# Patient Record
Sex: FEMALE | Race: BLACK OR AFRICAN AMERICAN | NOT HISPANIC OR LATINO | Employment: UNEMPLOYED | URBAN - METROPOLITAN AREA
[De-identification: names, ages, dates, MRNs, and addresses within clinical notes are randomized per-mention and may not be internally consistent; named-entity substitution may affect disease eponyms.]

---

## 2017-05-19 ENCOUNTER — HOSPITAL ENCOUNTER (EMERGENCY)
Facility: HOSPITAL | Age: 27
Discharge: HOME | End: 2017-05-19
Attending: EMERGENCY MEDICINE
Payer: COMMERCIAL

## 2017-05-19 VITALS
BODY MASS INDEX: 22.76 KG/M2 | RESPIRATION RATE: 16 BRPM | WEIGHT: 145 LBS | HEART RATE: 84 BPM | SYSTOLIC BLOOD PRESSURE: 142 MMHG | TEMPERATURE: 98 F | HEIGHT: 67 IN | OXYGEN SATURATION: 98 % | DIASTOLIC BLOOD PRESSURE: 86 MMHG

## 2017-05-19 DIAGNOSIS — R05.9 COUGH: ICD-10-CM

## 2017-05-19 DIAGNOSIS — S39.012A BACK STRAIN, INITIAL ENCOUNTER: Primary | ICD-10-CM

## 2017-05-19 PROCEDURE — 99283 EMERGENCY DEPT VISIT LOW MDM: CPT

## 2017-05-19 RX ORDER — CYCLOBENZAPRINE HCL 10 MG
10 TABLET ORAL 3 TIMES DAILY PRN
Qty: 15 TABLET | Refills: 0 | Status: SHIPPED | OUTPATIENT
Start: 2017-05-19 | End: 2017-05-24

## 2017-05-19 RX ORDER — NAPROXEN 375 MG/1
375 TABLET ORAL 2 TIMES DAILY WITH MEALS
Qty: 30 TABLET | Refills: 0 | OUTPATIENT
Start: 2017-05-19 | End: 2020-02-29

## 2017-05-19 NOTE — DISCHARGE INSTRUCTIONS
Back Sprain or Strain    Injury to the muscles (strain) or ligaments (sprain) around the spine can be troubling. Injury may occur after a sudden forceful twisting or bending force such as in a car accident, after a simple awkward movement, or after lifting something heavy with poor body positioning. In any case, muscle spasm is often present and adds to the pain.  Thankfully, most people feel better in 1 to 2 weeks, and most of the rest in 1 to 2 months. Most people can remain active. Unless you had a forceful or traumatic physical injury such as a car accident or fall, X-rays may not be ordered for the first evaluation of a back sprain or strain. If pain continues and does not respond to medical treatment, your healthcare provider may then order X-rays and other tests.  Home care  The following guidelines will help you care for your injury at home:  · When in bed, try to find a comfortable position. A firm mattress is best. Try lying flat on your back with pillows under your knees. You can also try lying on your side with your knees bent up toward your chest and a pillow between your knees.  · Don't sit for long periods. Try not to take long car rides or take other trips that have you sitting for a long time. This puts more stress on the lower back than standing or walking.  · During the first 24 to 72 hours after an injury or flare-up, apply an ice pack to the painful area for 20 minutes. Then remove it for 20 minutes. Do this for 60 to 90 minutes, or several times a day. This will reduce swelling and pain. Be sure to wrap the ice pack in a thin towel or plastic to protect your skin.  · You can start with ice, then switch to heat. Heat from a hot shower, hot bath, or heating pad reduces pain and works well for muscle spasms. Put heat on the painful area for 20 minutes, then remove for 20 minutes. Do this for 60 to 90 minutes, or several times a day. Do not use a heating pad while sleeping. It can burn the  skin.  · You can alternate the ice and heat. Talk with your healthcare provider to find out the best treatment or therapy for your back pain.  · Therapeutic massage will help relax the back muscles without stretching them.  · Be aware of safe lifting methods. Do not lift anything over 15 pounds until all of the pain is gone.  Medicines  Talk to your healthcare provider before using medicines, especially if you have other health problems or are taking other medicines.  · You may use acetaminophen or ibuprofen to control pain, unless another pain medicine was prescribed. If you have chronic conditions like diabetes, liver or kidney disease, stomach ulcers, or gastrointestinal bleeding, or are taking blood-thinner medicines, talk with your doctor before taking any medicines.  · Be careful if you are given prescription medicines, narcotics, or medicine for muscle spasm. They can cause drowsiness, and affect your coordination, reflexes, and judgment. Do not drive or operate heavy machinery when taking these types of medicines. Only take pain medicine as prescribed by your healthcare provider.  Follow-up care  Follow up with your healthcare provider, or as advised. You may need physical therapy or more tests if your symptoms get worse.  If you had X-rays your healthcare provider may be checking for any broken bones, breaks, or fractures. Bruises and sprains can sometimes hurt as much as a fracture. These injuries can take time to heal completely. If your symptoms dont improve or they get worse, talk with your healthcare provider. You may need a repeat X-ray or other tests.  Call 911  Call for emergency care if any of the following occur:  · Trouble breathing  · Confused  · Very drowsy or trouble awakening  · Fainting or loss of consciousness  · Rapid or very slow heart rate  · Loss of bowel or bladder control  When to seek medical advice  Call your healthcare provider right away if any of the following occur:  · Pain  gets worse or spreads to your arms or legs  · Weakness or numbness in one or both arms or legs  · Numbness in the groin or genital area  Date Last Reviewed: 6/1/2016  © 9452-6900 [employer]. [address] [place] [address], [place] [address]. All rights reserved. This information is not intended as a substitute for professional medical care. Always follow your healthcare professional's instructions.

## 2017-05-19 NOTE — ED AVS SNAPSHOT
[facility] -   [id number] [facility] [id number]               Yocasta Szymanski   2017 12:45 PM   ED    Description:  Female : 1990   Department:  [facility]           Your Care was Coordinated By:     Provider Role From To    Anoop Bergman MD Attending Provider 17 [phone] --      Reason for Visit     Back Pain           Diagnoses this Visit        Comments    Back strain, initial encounter    -  Primary     Cough           ED Disposition     ED Disposition Condition Comment    Discharge             To Do List           Follow-up Information     Follow up with PCP In 2 days.    Contact information:    [phone]       These Medications        Disp Refills Start End    naproxen (NAPROSYN) 375 MG tablet 30 tablet 0 2017     Take 1 tablet (375 mg total) by mouth 2 (two) times daily with meals. - Oral    cyclobenzaprine (FLEXERIL) 10 MG tablet 15 tablet 0 2017    Take 1 tablet (10 mg total) by mouth 3 (three) times daily as needed for Muscle spasms. - Oral      [facility]     [facility] Care [facility] -  Assistance  Unless otherwise directed by your provider, please contact [facility], our nurse care line that is available for  assistance.     Registered nurses in the [facility] provide: appointment scheduling, clinical advisement, health education, and other advisory services.  Call: [phone] (toll free)               Medications           Message regarding Medications     Verify the changes and/or additions to your medication regime listed below are the same as discussed with your clinician today.  If any of these changes or additions are incorrect, please notify your healthcare provider.        START taking these NEW medications        Refills    naproxen (NAPROSYN) 375 MG tablet 0    Sig: Take 1 tablet (375 mg total) by mouth 2 (two) times daily with meals.     "Class: Print    Route: Oral    cyclobenzaprine (FLEXERIL) 10 MG tablet 0    Sig: Take 1 tablet (10 mg total) by mouth 3 (three) times daily as needed for Muscle spasms.    Class: Print    Route: Oral           Verify that the below list of medications is an accurate representation of the medications you are currently taking.  If none reported, the list may be blank. If incorrect, please contact your healthcare provider. Carry this list with you in case of emergency.           Current Medications     cyclobenzaprine (FLEXERIL) 10 MG tablet Take 1 tablet (10 mg total) by mouth 3 (three) times daily as needed for Muscle spasms.    naproxen (NAPROSYN) 375 MG tablet Take 1 tablet (375 mg total) by mouth 2 (two) times daily with meals.           Clinical Reference Information           Your Vitals Were     BP Pulse Temp Resp Height Weight    142/86 (BP Location: Right arm, Patient Position: Sitting) 84 98.1 °F (36.7 °C) (Oral) 16 5' 7" (1.702 m) 65.8 kg (145 lb)    SpO2 BMI             98% 22.71 kg/m2         Allergies as of 5/19/2017     No Known Allergies      Immunizations Administered on Date of Encounter - 5/19/2017     None      ED Micro, Lab, POCT     None      ED Imaging Orders     Start Ordered       Status Ordering Provider    05/19/17 1256 05/19/17 1255  X-Ray Chest PA And Lateral  1 time imaging      Final result         Discharge Instructions         Back Sprain or Strain    Injury to the muscles (strain) or ligaments (sprain) around the spine can be troubling. Injury may occur after a sudden forceful twisting or bending force such as in a car accident, after a simple awkward movement, or after lifting something heavy with poor body positioning. In any case, muscle spasm is often present and adds to the pain.  Thankfully, most people feel better in 1 to 2 weeks, and most of the rest in 1 to 2 months. Most people can remain active. Unless you had a forceful or traumatic physical injury such as a car accident " or fall, X-rays may not be ordered for the first evaluation of a back sprain or strain. If pain continues and does not respond to medical treatment, your healthcare provider may then order X-rays and other tests.  Home care  The following guidelines will help you care for your injury at home:  · When in bed, try to find a comfortable position. A firm mattress is best. Try lying flat on your back with pillows under your knees. You can also try lying on your side with your knees bent up toward your chest and a pillow between your knees.  · Don't sit for long periods. Try not to take long car rides or take other trips that have you sitting for a long time. This puts more stress on the lower back than standing or walking.  · During the first 24 to 72 hours after an injury or flare-up, apply an ice pack to the painful area for 20 minutes. Then remove it for 20 minutes. Do this for 60 to 90 minutes, or several times a day. This will reduce swelling and pain. Be sure to wrap the ice pack in a thin towel or plastic to protect your skin.  · You can start with ice, then switch to heat. Heat from a hot shower, hot bath, or heating pad reduces pain and works well for muscle spasms. Put heat on the painful area for 20 minutes, then remove for 20 minutes. Do this for 60 to 90 minutes, or several times a day. Do not use a heating pad while sleeping. It can burn the skin.  · You can alternate the ice and heat. Talk with your healthcare provider to find out the best treatment or therapy for your back pain.  · Therapeutic massage will help relax the back muscles without stretching them.  · Be aware of safe lifting methods. Do not lift anything over 15 pounds until all of the pain is gone.  Medicines  Talk to your healthcare provider before using medicines, especially if you have other health problems or are taking other medicines.  · You may use acetaminophen or ibuprofen to control pain, unless another pain medicine was prescribed.  If you have chronic conditions like diabetes, liver or kidney disease, stomach ulcers, or gastrointestinal bleeding, or are taking blood-thinner medicines, talk with your doctor before taking any medicines.  · Be careful if you are given prescription medicines, narcotics, or medicine for muscle spasm. They can cause drowsiness, and affect your coordination, reflexes, and judgment. Do not drive or operate heavy machinery when taking these types of medicines. Only take pain medicine as prescribed by your healthcare provider.  Follow-up care  Follow up with your healthcare provider, or as advised. You may need physical therapy or more tests if your symptoms get worse.  If you had X-rays your healthcare provider may be checking for any broken bones, breaks, or fractures. Bruises and sprains can sometimes hurt as much as a fracture. These injuries can take time to heal completely. If your symptoms dont improve or they get worse, talk with your healthcare provider. You may need a repeat X-ray or other tests.  Call 911  Call for emergency care if any of the following occur:  · Trouble breathing  · Confused  · Very drowsy or trouble awakening  · Fainting or loss of consciousness  · Rapid or very slow heart rate  · Loss of bowel or bladder control  When to seek medical advice  Call your healthcare provider right away if any of the following occur:  · Pain gets worse or spreads to your arms or legs  · Weakness or numbness in one or both arms or legs  · Numbness in the groin or genital area  Date Last Reviewed: 6/1/2016  © 2408-2357 [employer]. [address] [place] [address]. All rights reserved. This information is not intended as a substitute for professional medical care. Always follow your healthcare professional's instructions.          [facility] Sign-Up     Activating your [facility] account is as easy as 1-2-3!     1) Visit [email or web address], select Sign Up Now, enter this activation code and your  date of birth, then select Next.  [email or web address]  Expires: 7/3/2017  1:23 PM      2) Create a username and password to use when you visit [facility] in the future and select a security question in case you lose your password and select Next.    3) Enter your e-mail address and click Sign Up!    Additional Information  If you have questions, please e-mail [employer] [email or web address] or call [phone] to talk to our [facility] staff. Remember, [facility] is NOT to be used for urgent needs. For medical emergencies, dial 911.          [facility] complies with applicable Federal civil rights laws and does not discriminate on the basis of race, color, national origin, age, disability, or sex.        Language Assistance Services     ATTENTION: Language assistance services are available, free of charge. Please call [phone].      ATENCIÓN: Si habla español, tiene a oden disposición servicios gratuitos de asistencia lingüística. Llame al [phone].     CHÚ Ý: N?u b?n nói Ti?ng Vi?t, có các d?ch v? h? tr? ngôn ng? mi?n phí dành cho b?n. G?i s? [phone].

## 2017-05-19 NOTE — ED PROVIDER NOTES
SCRIBE #1 NOTE: I, Yesica Welch, am scribing for, and in the presence of, Anoop Bergman MD. I have scribed the entire note.      History      Chief Complaint   Patient presents with    Back Pain     pt states her back has been hurting for 3 days and she has had a cough       Review of patient's allergies indicates:  No Known Allergies     HPI   HPI    5/19/2017, 12:51 PM   History obtained from the patient      History of Present Illness: Yocasta Szymanski is a 27 y.o. female patient who presents to the Emergency Department for back pain which onset gradually 3 days ago. Pt reports that she does a lot of heavy lifting at work. Symptoms are constant and moderate in severity.  No mitigating or exacerbating factors reported. Associated sxs include cough. Patient denies any fever, chills, extremity weakness/numbness, saddle anesthesia, bowel/bladder incontinence, and all other sxs at this time. No further complaints or concerns at this time.        Arrival mode: Personal vehicle     PCP: Unknown      Past Medical History:  History reviewed. No pertinent past medical history.    Past Surgical History:  History reviewed. No pertinent surgical history.      Family History:  History reviewed. No pertinent family history.    Social History:  Social History     Social History Main Topics    Smoking status: Never Smoker    Smokeless tobacco: No    Alcohol use No    Drug use: No    Sexual activity: Unknown       ROS   Review of Systems   Constitutional: Negative for chills and fever.   HENT: Negative for sore throat.    Respiratory: Positive for cough. Negative for shortness of breath.    Cardiovascular: Negative for chest pain.   Gastrointestinal: Negative for nausea.   Genitourinary: Negative for dysuria.        - bowel/bladder incontinence   Musculoskeletal: Positive for back pain.   Skin: Negative for rash.   Neurological: Negative for weakness and numbness.        - saddle anesthesia   Hematological: Does  "not bruise/bleed easily.       Physical Exam    Initial Vitals   BP Pulse Resp Temp SpO2   05/19/17 1245 05/19/17 1245 05/19/17 1245 05/19/17 1245 05/19/17 1245   142/86 84 16 98.1 °F (36.7 °C) 98 %      Physical Exam  Nursing Notes and Vital Signs Reviewed.  Constitutional: Patient is in no apparent distress. Well-developed and well-nourished.  Head: Atraumatic. Normocephalic.  Eyes: PERRL. EOM intact. Conjunctivae are not pale. No scleral icterus.  ENT: Mucous membranes are moist. Oropharynx is clear and symmetric.    Neck: Supple. Full ROM. No lymphadenopathy. No cervical midline bony tenderness, deformities, or step-offs.  Cardiovascular: Regular rate. Regular rhythm. No murmurs, rubs, or gallops. Distal pulses are 2+ and symmetric.  Pulmonary/Chest: No respiratory distress. Clear to auscultation bilaterally. No wheezing, rales, or rhonchi.  Abdominal: Soft and non-distended.  There is no tenderness.  No rebound, guarding, or rigidity. Good bowel sounds. Ostomy bag to LLQ.  Back: Bilat muscle spasms. No midline bony tenderness, deformities, or step-offs of the T-spine or L-spine. Skin appears normal without abrasions or bruising. No erythema, induration, or fluctuance.   Musculoskeletal: Moves all extremities. No obvious deformities. No edema. No calf tenderness.  Skin: Warm and dry.  Neurological:  Alert, awake, and appropriate.  Normal speech.  No acute focal neurological deficits are appreciated.  Psychiatric: Normal affect. Good eye contact. Appropriate in content.    ED Course    Procedures  ED Vital Signs:  Vitals:    05/19/17 1245   BP: (!) 142/86   Pulse: 84   Resp: 16   Temp: 98.1 °F (36.7 °C)   TempSrc: Oral   SpO2: 98%   Weight: 65.8 kg (145 lb)   Height: 5' 7" (1.702 m)         Imaging Results:    Imaging Results         X-Ray Chest PA And Lateral (Final result) Result time:  05/19/17 13:13:05    Final result by Kvng Maldonado MD (05/19/17 13:13:05)    Impression:         Negative chest " radiograph.            Electronically signed by: OTTONIEL QUINTANA MD  Date:     05/19/17  Time:    13:13     Narrative:    Exam: Two-view chest radiograph    Clinical History: cough.      Comparison: None.    Findings:   The lungs are clear. The cardiac silhouette is within normal limits. No pleural effusion or pneumothorax. The bones are intact.                       The Emergency Provider reviewed the vital signs and test results, which are outlined above.    ED Discussion     1:20 PM: Reassessed pt at this time.   Discussed with pt all pertinent ED information and results. Discussed pt dx and plan of tx. Gave pt all f/u and return to the ED instructions. All questions and concerns were addressed at this time. Pt expresses understanding of information and instructions, and is comfortable with plan to discharge. Pt is stable for discharge.      Follow-up Information     Follow up with PCP In 2 days.    Contact information:    [phone]            Medical Decision Making    Medical Decision Making:   Clinical Tests:   Radiological Study: Ordered and Reviewed           Scribe Attestation:   Scribe #1: I performed the above scribed service and the documentation accurately describes the services I performed. I attest to the accuracy of the note.    Attending:   Physician Attestation Statement for Scribe #1: I, Anoop Bergman MD, personally performed the services described in this documentation, as scribed by Yesica Welch, in my presence, and it is both accurate and complete.          Clinical Impression       ICD-10-CM ICD-9-CM   1. Back strain, initial encounter S39.012A 847.9   2. Cough R05 786.2       Disposition:   Disposition: Discharged  Condition: Stable         Anoop Bergman MD  05/19/17 1682

## 2017-08-15 ENCOUNTER — HOSPITAL ENCOUNTER (EMERGENCY)
Facility: HOSPITAL | Age: 27
Discharge: HOME | End: 2017-08-15
Attending: EMERGENCY MEDICINE
Payer: MEDICAID

## 2017-08-15 VITALS
RESPIRATION RATE: 19 BRPM | HEIGHT: 67 IN | BODY MASS INDEX: 21.97 KG/M2 | TEMPERATURE: 98 F | WEIGHT: 140 LBS | HEART RATE: 63 BPM | DIASTOLIC BLOOD PRESSURE: 89 MMHG | OXYGEN SATURATION: 98 % | SYSTOLIC BLOOD PRESSURE: 159 MMHG

## 2017-08-15 DIAGNOSIS — R11.2 NON-INTRACTABLE VOMITING WITH NAUSEA, UNSPECIFIED VOMITING TYPE: Primary | ICD-10-CM

## 2017-08-15 LAB
B-HCG UR QL: NEGATIVE
BACTERIA #/AREA URNS HPF: ABNORMAL /HPF
BILIRUB UR QL STRIP: NEGATIVE
CLARITY UR: CLEAR
COLOR UR: YELLOW
GLUCOSE UR QL STRIP: NEGATIVE
HGB UR QL STRIP: ABNORMAL
HYALINE CASTS #/AREA URNS LPF: 0 /LPF
KETONES UR QL STRIP: NEGATIVE
LEUKOCYTE ESTERASE UR QL STRIP: NEGATIVE
MICROSCOPIC COMMENT: ABNORMAL
NITRITE UR QL STRIP: NEGATIVE
PH UR STRIP: 6 [PH] (ref 5–8)
PROT UR QL STRIP: ABNORMAL
RBC #/AREA URNS HPF: 10 /HPF (ref 0–4)
SP GR UR STRIP: >=1.03 (ref 1–1.03)
SQUAMOUS #/AREA URNS HPF: 5 /HPF
URN SPEC COLLECT METH UR: ABNORMAL
UROBILINOGEN UR STRIP-ACNC: NEGATIVE EU/DL
WBC #/AREA URNS HPF: 3 /HPF (ref 0–5)
YEAST URNS QL MICRO: ABNORMAL

## 2017-08-15 PROCEDURE — 99283 EMERGENCY DEPT VISIT LOW MDM: CPT

## 2017-08-15 PROCEDURE — 81025 URINE PREGNANCY TEST: CPT

## 2017-08-15 PROCEDURE — 81000 URINALYSIS NONAUTO W/SCOPE: CPT

## 2017-08-15 RX ORDER — PROMETHAZINE HYDROCHLORIDE 25 MG/1
25 TABLET ORAL EVERY 6 HOURS PRN
Qty: 15 TABLET | Refills: 0 | Status: ON HOLD | OUTPATIENT
Start: 2017-08-15 | End: 2023-08-30 | Stop reason: HOSPADM

## 2017-08-15 NOTE — ED PROVIDER NOTES
SCRIBE #1 NOTE: I, Jaxson Perez, am scribing for, and in the presence of, Viktor Seo Jr., MD. I have scribed the entire note.     SCRIBE #2 NOTE: I, Gabriella Fischer, am scribing for, and in the presence of,  Binu Troncoso MD. I have scribed the remaining portions of the note not scribed by Scribe #1.     History      Chief Complaint   Patient presents with    Nausea     x 2 days. LMP aug 1st. reports it was irregular.       Review of patient's allergies indicates:  No Known Allergies     HPI   HPI    8/15/2017, 12:43 AM   History obtained from the patient      History of Present Illness: Yocasta Szymanski is a 27 y.o. female patient who presents to the Emergency Department for nausea which onset gradually 2 days ago. Symptoms are intermittent and moderate in severity. Pt's LMP was on August 1st but states it was irregular. No mitigating or exacerbating factors reported. Associated sxs include vomiting. Patient denies any fever, chills, diarrhea, blood in stool, constipation, abdominal pain, dysuria, hematuria, vaginal bleeding, and all other sxs at this time. No further complaints or concerns at this time.         Arrival mode: Personal vehicle    PCP: Primary Doctor No       Past Medical History:  Unknown    Past Surgical History:  Unknown    Family History:  Unknown    Social History:  Social History     Social History Main Topics    Smoking status: Never Smoker    Smokeless tobacco: Unknown    Alcohol use No    Drug use: No    Sexual activity: Unknown       ROS   Review of Systems   Constitutional: Negative for chills and fever.   HENT: Negative for sore throat.    Respiratory: Negative for shortness of breath.    Cardiovascular: Negative for chest pain.   Gastrointestinal: Positive for nausea and vomiting. Negative for abdominal pain, anal bleeding, blood in stool, constipation and diarrhea.   Genitourinary: Negative for dysuria, hematuria, vaginal bleeding, vaginal discharge and vaginal pain.  "  Musculoskeletal: Negative for back pain.   Skin: Negative for rash.   Neurological: Negative for weakness.   Hematological: Does not bruise/bleed easily.       Physical Exam      Initial Vitals [08/15/17 0021]   BP Pulse Resp Temp SpO2   (!) 159/89 63 19 97.9 °F (36.6 °C) 98 %      MAP       112.33          Physical Exam  Nursing Notes and Vital Signs Reviewed.  Constitutional: Patient is in no acute distress. Well-developed and well-nourished.  Head: Atraumatic. Normocephalic.  Eyes: PERRL. EOM intact. Conjunctivae are not pale. No scleral icterus.  ENT: Mucous membranes are moist. Oropharynx is clear and symmetric.    Neck: Supple. Full ROM. No lymphadenopathy.  Cardiovascular: Regular rate. Regular rhythm. No murmurs, rubs, or gallops. Distal pulses are 2+ and symmetric.  Pulmonary/Chest: No respiratory distress. Clear to auscultation bilaterally. No wheezing, rales, or rhonchi.  Abdominal: Soft and non-distended.  There is no tenderness.  No rebound, guarding, or rigidity.   Musculoskeletal: Moves all extremities. No obvious deformities. No edema.   Skin: Warm and dry.  Neurological:  Alert, awake, and appropriate.  Normal speech.  No acute focal neurological deficits are appreciated.  Psychiatric: Normal affect. Good eye contact. Appropriate in content.    ED Course    Procedures  ED Vital Signs:  Vitals:    08/15/17 0021   BP: (!) 159/89   Pulse: 63   Resp: 19   Temp: 97.9 °F (36.6 °C)   TempSrc: Oral   SpO2: 98%   Weight: 63.5 kg (140 lb)   Height: 5' 7" (1.702 m)       Abnormal Lab Results:  Labs Reviewed   URINALYSIS - Abnormal; Notable for the following:        Result Value    Specific Gravity, UA >=1.030 (*)     Protein, UA 2+ (*)     Occult Blood UA 2+ (*)     All other components within normal limits   URINALYSIS MICROSCOPIC - Abnormal; Notable for the following:     RBC, UA 10 (*)     Bacteria, UA Few (*)     Yeast, UA Rare (*)     All other components within normal limits   PREGNANCY TEST, URINE " RAPID        All Lab Results:  Results for orders placed or performed during the hospital encounter of 08/15/17   Urinalysis   Result Value Ref Range    Specimen UA Urine, Clean Catch     Color, UA Yellow Yellow, Straw, Sabine    Appearance, UA Clear Clear    pH, UA 6.0 5.0 - 8.0    Specific Gravity, UA >=1.030 (A) 1.005 - 1.030    Protein, UA 2+ (A) Negative    Glucose, UA Negative Negative    Ketones, UA Negative Negative    Bilirubin (UA) Negative Negative    Occult Blood UA 2+ (A) Negative    Nitrite, UA Negative Negative    Urobilinogen, UA Negative <2.0 EU/dL    Leukocytes, UA Negative Negative   Pregnancy, urine rapid   Result Value Ref Range    Preg Test, Ur Negative    Urinalysis Microscopic   Result Value Ref Range    RBC, UA 10 (H) 0 - 4 /hpf    WBC, UA 3 0 - 5 /hpf    Bacteria, UA Few (A) None-Occ /hpf    Yeast, UA Rare (A) None    Squam Epithel, UA 5 /hpf    Hyaline Casts, UA 0 0-1/lpf /lpf    Microscopic Comment SEE COMMENT                   The Emergency Provider reviewed the vital signs and test results, which are outlined above.    ED Discussion     1:07 AM: Dr. Seo transfers care of pt to Dr. Troncoso, pending lab results.      2:22 AM: Reassessed pt at this time.  Pt is awake, alert, and in no distress. Discussed with pt all pertinent ED information and results. Discussed pt dx and plan of tx. Gave pt all f/u and return to the ED instructions. All questions and concerns were addressed at this time. Pt expresses understanding of information and instructions, and is comfortable with plan to discharge. Pt is stable for discharge.    I discussed with patient and/or family/caretaker that evaluation in the ED does not suggest any emergent or life threatening medical conditions requiring immediate intervention beyond what was provided in the ED, and I believe patient is safe for discharge.  Regardless, an unremarkable evaluation in the ED does not preclude the development or presence of a serious of life  threatening condition. As such, patient was instructed to return immediately for any worsening or change in current symptoms.      ED Medication(s):  Medications - No data to display    Discharge Medication List as of 8/15/2017  2:26 AM      START taking these medications    Details   promethazine (PHENERGAN) 25 MG tablet Take 1 tablet (25 mg total) by mouth every 6 (six) hours as needed for Nausea., Starting Tue 8/15/2017, Print             Follow-up Information     [facility] In 2 days.    Specialty:  Internal Medicine  Contact information:  [address] [facility] [address] [phone]  Additional information:  (off [facility]) 1st floor           [facility] .    Specialty:  Emergency Medicine  Why:  If symptoms worsen  Contact information:  [address] [facility] [address] [phone]                   Medical Decision Making    Medical Decision Making:   Clinical Tests:   Lab Tests: Ordered and Reviewed           Scribe Attestation:   Scribe #1: I performed the above scribed service and the documentation accurately describes the services I performed. I attest to the accuracy of the note.    Attending:   Physician Attestation Statement for Scribe #1: I, Viktor Seo Jr., MD, personally performed the services described in this documentation, as scribed by Jaxson Perez, in my presence, and it is both accurate and complete.       Scribe Attestation:   Scribe #2: I performed the above scribed service and the documentation accurately describes the services I performed. I attest to the accuracy of the note.    Attending Attestation:           Physician Attestation for Scribe:    Physician Attestation Statement for Scribe #2: I, Binu Troncoso MD, reviewed documentation, as scribed by Gabriella Fischer in my presence, and it is both accurate and complete. I also acknowledge and confirm the content of the note done by Sharonda  #1.          Clinical Impression       ICD-10-CM ICD-9-CM   1. Non-intractable vomiting with nausea, unspecified vomiting type R11.2 787.01       Disposition:   Disposition: Discharged  Condition: Stable         Binu Troncoso MD  08/15/17 [id number]

## 2018-05-31 ENCOUNTER — HOSPITAL ENCOUNTER (EMERGENCY)
Facility: HOSPITAL | Age: 28
Discharge: HOME | End: 2018-05-31
Payer: MEDICAID

## 2018-05-31 VITALS
TEMPERATURE: 99 F | BODY MASS INDEX: 21.28 KG/M2 | WEIGHT: 135.56 LBS | HEIGHT: 67 IN | HEART RATE: 62 BPM | OXYGEN SATURATION: 100 % | DIASTOLIC BLOOD PRESSURE: 103 MMHG | SYSTOLIC BLOOD PRESSURE: 147 MMHG | RESPIRATION RATE: 16 BRPM

## 2018-05-31 DIAGNOSIS — N30.01 ACUTE CYSTITIS WITH HEMATURIA: Primary | ICD-10-CM

## 2018-05-31 DIAGNOSIS — J18.9 ATYPICAL PNEUMONIA: ICD-10-CM

## 2018-05-31 DIAGNOSIS — R10.31 RLQ ABDOMINAL PAIN: ICD-10-CM

## 2018-05-31 DIAGNOSIS — D36.9 DERMOID CYST: ICD-10-CM

## 2018-05-31 LAB
ALBUMIN SERPL BCP-MCNC: 2.8 G/DL
ALP SERPL-CCNC: 73 U/L
ALT SERPL W/O P-5'-P-CCNC: 16 U/L
ANION GAP SERPL CALC-SCNC: 7 MMOL/L
AST SERPL-CCNC: 30 U/L
B-HCG UR QL: NEGATIVE
BACTERIA #/AREA URNS HPF: ABNORMAL /HPF
BASOPHILS # BLD AUTO: 0 K/UL
BASOPHILS NFR BLD: 0 %
BILIRUB SERPL-MCNC: 0.5 MG/DL
BILIRUB UR QL STRIP: ABNORMAL
BUN SERPL-MCNC: 7 MG/DL
CALCIUM SERPL-MCNC: 8.7 MG/DL
CHLORIDE SERPL-SCNC: 113 MMOL/L
CLARITY UR: CLEAR
CO2 SERPL-SCNC: 23 MMOL/L
COLOR UR: YELLOW
CREAT SERPL-MCNC: 0.7 MG/DL
DIFFERENTIAL METHOD: ABNORMAL
EOSINOPHIL # BLD AUTO: 0 K/UL
EOSINOPHIL NFR BLD: 0.3 %
ERYTHROCYTE [DISTWIDTH] IN BLOOD BY AUTOMATED COUNT: 14.6 %
EST. GFR  (AFRICAN AMERICAN): >60 ML/MIN/1.73 M^2
EST. GFR  (NON AFRICAN AMERICAN): >60 ML/MIN/1.73 M^2
GLUCOSE SERPL-MCNC: 85 MG/DL
GLUCOSE UR QL STRIP: NEGATIVE
HCT VFR BLD AUTO: 31.4 %
HGB BLD-MCNC: 11 G/DL
HGB UR QL STRIP: ABNORMAL
HYALINE CASTS #/AREA URNS LPF: 0 /LPF
KETONES UR QL STRIP: NEGATIVE
LEUKOCYTE ESTERASE UR QL STRIP: NEGATIVE
LYMPHOCYTES # BLD AUTO: 1.6 K/UL
LYMPHOCYTES NFR BLD: 48.9 %
MCH RBC QN AUTO: 31 PG
MCHC RBC AUTO-ENTMCNC: 35 G/DL
MCV RBC AUTO: 89 FL
MICROSCOPIC COMMENT: ABNORMAL
MONOCYTES # BLD AUTO: 0.3 K/UL
MONOCYTES NFR BLD: 8 %
NEUTROPHILS # BLD AUTO: 1.4 K/UL
NEUTROPHILS NFR BLD: 42.8 %
NITRITE UR QL STRIP: NEGATIVE
PH UR STRIP: 7 [PH] (ref 5–8)
PLATELET # BLD AUTO: 162 K/UL
PMV BLD AUTO: 10 FL
POTASSIUM SERPL-SCNC: 3.3 MMOL/L
PROT SERPL-MCNC: 9.1 G/DL
PROT UR QL STRIP: ABNORMAL
RBC # BLD AUTO: 3.55 M/UL
RBC #/AREA URNS HPF: 6 /HPF (ref 0–4)
SODIUM SERPL-SCNC: 143 MMOL/L
SP GR UR STRIP: >=1.03 (ref 1–1.03)
SQUAMOUS #/AREA URNS HPF: 20 /HPF
URN SPEC COLLECT METH UR: ABNORMAL
UROBILINOGEN UR STRIP-ACNC: ABNORMAL EU/DL
WBC # BLD AUTO: 3.27 K/UL
WBC #/AREA URNS HPF: 4 /HPF (ref 0–5)

## 2018-05-31 PROCEDURE — 99284 EMERGENCY DEPT VISIT MOD MDM: CPT | Mod: 25

## 2018-05-31 PROCEDURE — 85025 COMPLETE CBC W/AUTO DIFF WBC: CPT

## 2018-05-31 PROCEDURE — 80053 COMPREHEN METABOLIC PANEL: CPT

## 2018-05-31 PROCEDURE — 81025 URINE PREGNANCY TEST: CPT

## 2018-05-31 PROCEDURE — 81000 URINALYSIS NONAUTO W/SCOPE: CPT

## 2018-05-31 RX ORDER — IBUPROFEN 600 MG/1
600 TABLET ORAL EVERY 6 HOURS PRN
Qty: 20 TABLET | Refills: 0 | Status: ON HOLD | OUTPATIENT
Start: 2018-05-31 | End: 2023-08-30 | Stop reason: HOSPADM

## 2018-05-31 RX ORDER — LEVOFLOXACIN 500 MG/1
500 TABLET, FILM COATED ORAL DAILY
Qty: 5 TABLET | Refills: 0 | Status: SHIPPED | OUTPATIENT
Start: 2018-05-31 | End: 2018-06-05

## 2018-05-31 RX ORDER — ACETAMINOPHEN AND CODEINE PHOSPHATE 300; 30 MG/1; MG/1
1 TABLET ORAL EVERY 6 HOURS PRN
Qty: 12 TABLET | Refills: 0 | Status: SHIPPED | OUTPATIENT
Start: 2018-05-31 | End: 2018-06-10

## 2018-06-01 NOTE — ED PROVIDER NOTES
SCRIBE #1 NOTE: I, Hemalatha Posada, am scribing for, and in the presence of, Viktor Bautista NP. I have scribed the entire note.      History      Chief Complaint   Patient presents with    Flank Pain     Right side.  Denies any urinary or bowel symptoms.       Review of patient's allergies indicates:  No Known Allergies     HPI   HPI    5/31/2018, 7:10 PM   History obtained from the patient      History of Present Illness: Yocasta Szymanski is a 28 y.o. female patient who presents to the Emergency Department for R side abdominal pain which onset today. Symptoms are intermittent and moderate in severity. Pt reports having to leave work today due to sxs. Pt reports LMP was in April. No mitigating or exacerbating factors reported. No associated sxs at this time. Patient denies any CP, SOB, fever, chills, n/v/d, dysuria, hematuria, and all other sxs at this time. No prior Tx. No further complaints or concerns at this time.         Arrival mode: Personal vehicle    PCP: Primary Doctor No       Past Medical History:  Past medical history reviewed not relevant      Past Surgical History:  Past surgical history reviewed not relevant      Family History:  Family history reviewed not relevant      Social History:  Social History    Social History Main Topics    Social History Main Topics    Smoking status: Never smoker    Smokeless tobacco: Unknown if ever used    Alcohol Use: No    Drug Use: No    Sexual Activity: Unknown         ROS   Review of Systems   Constitutional: Negative for chills and fever.   HENT: Negative for sore throat.    Respiratory: Negative for shortness of breath.    Cardiovascular: Negative for chest pain.   Gastrointestinal: Positive for abdominal pain. Negative for diarrhea, nausea and vomiting.   Genitourinary: Negative for dysuria and hematuria.   Musculoskeletal: Negative for back pain and neck pain.   Skin: Negative for rash.   Neurological: Negative for dizziness, weakness and  "headaches.   Hematological: Does not bruise/bleed easily.   All other systems reviewed and are negative.      Physical Exam      Initial Vitals [05/31/18 1837]   BP Pulse Resp Temp SpO2   134/85 99 16 99.3 °F (37.4 °C) 97 %      MAP       101.33          Physical Exam  Nursing Notes and Vital Signs Reviewed.  Constitutional: Patient is in no acute distress. Well-developed and well-nourished.  Head: Atraumatic. Normocephalic.  Eyes: PERRL. EOM intact. Conjunctivae are not pale. No scleral icterus.  ENT: Mucous membranes are moist. Oropharynx is clear and symmetric.    Neck: Supple. Full ROM. No lymphadenopathy.  Cardiovascular: Regular rate. Regular rhythm. No murmurs, rubs, or gallops. Distal pulses are 2+ and symmetric.  Pulmonary/Chest: No respiratory distress. Clear to auscultation bilaterally. No wheezing or rales.  Abdominal: Soft and non-distended.  There is RLQ tenderness.  No rebound, guarding, or rigidity. Good bowel sounds.  Genitourinary: No CVA tenderness  Musculoskeletal: Moves all extremities. No obvious deformities. No edema. No calf tenderness.  Skin: Warm and dry.  Neurological:  Alert, awake, and appropriate.  Normal speech.  No acute focal neurological deficits are appreciated.  Psychiatric: Normal affect. Good eye contact. Appropriate in content.    ED Course    Procedures  ED Vital Signs:  Vitals:    05/31/18 1837   BP: 134/85   Pulse: 99   Resp: 16   Temp: 99.3 °F (37.4 °C)   TempSrc: Oral   SpO2: 97%   Weight: 61.5 kg (135 lb 9.3 oz)   Height: 5' 7" (1.702 m)       Abnormal Lab Results:  Labs Reviewed   CBC W/ AUTO DIFFERENTIAL - Abnormal; Notable for the following:        Result Value    WBC 3.27 (*)     RBC 3.55 (*)     Hemoglobin 11.0 (*)     Hematocrit 31.4 (*)     RDW 14.6 (*)     Gran # (ANC) 1.4 (*)     Lymph% 48.9 (*)     All other components within normal limits   COMPREHENSIVE METABOLIC PANEL - Abnormal; Notable for the following:     Potassium 3.3 (*)     Chloride 113 (*)     Total " Protein 9.1 (*)     Albumin 2.8 (*)     Anion Gap 7 (*)     All other components within normal limits   URINALYSIS - Abnormal; Notable for the following:     Specific Gravity, UA >=1.030 (*)     Protein, UA 3+ (*)     Bilirubin (UA) 1+ (*)     Occult Blood UA 2+ (*)     Urobilinogen, UA 2.0-3.0 (*)     All other components within normal limits   URINALYSIS MICROSCOPIC - Abnormal; Notable for the following:     RBC, UA 6 (*)     All other components within normal limits   PREGNANCY TEST, URINE RAPID        All Lab Results:  Results for orders placed or performed during the hospital encounter of 05/31/18   CBC auto differential   Result Value Ref Range    WBC 3.27 (L) 3.90 - 12.70 K/uL    RBC 3.55 (L) 4.00 - 5.40 M/uL    Hemoglobin 11.0 (L) 12.0 - 16.0 g/dL    Hematocrit 31.4 (L) 37.0 - 48.5 %    MCV 89 82 - 98 fL    MCH 31.0 27.0 - 31.0 pg    MCHC 35.0 32.0 - 36.0 g/dL    RDW 14.6 (H) 11.5 - 14.5 %    Platelets 162 150 - 350 K/uL    MPV 10.0 9.2 - 12.9 fL    Gran # (ANC) 1.4 (L) 1.8 - 7.7 K/uL    Lymph # 1.6 1.0 - 4.8 K/uL    Mono # 0.3 0.3 - 1.0 K/uL    Eos # 0.0 0.0 - 0.5 K/uL    Baso # 0.00 0.00 - 0.20 K/uL    Gran% 42.8 38.0 - 73.0 %    Lymph% 48.9 (H) 18.0 - 48.0 %    Mono% 8.0 4.0 - 15.0 %    Eosinophil% 0.3 0.0 - 8.0 %    Basophil% 0.0 0.0 - 1.9 %    Differential Method Automated    Comprehensive metabolic panel   Result Value Ref Range    Sodium 143 136 - 145 mmol/L    Potassium 3.3 (L) 3.5 - 5.1 mmol/L    Chloride 113 (H) 95 - 110 mmol/L    CO2 23 23 - 29 mmol/L    Glucose 85 70 - 110 mg/dL    BUN, Bld 7 6 - 20 mg/dL    Creatinine 0.7 0.5 - 1.4 mg/dL    Calcium 8.7 8.7 - 10.5 mg/dL    Total Protein 9.1 (H) 6.0 - 8.4 g/dL    Albumin 2.8 (L) 3.5 - 5.2 g/dL    Total Bilirubin 0.5 0.1 - 1.0 mg/dL    Alkaline Phosphatase 73 55 - 135 U/L    AST 30 10 - 40 U/L    ALT 16 10 - 44 U/L    Anion Gap 7 (L) 8 - 16 mmol/L    eGFR if African American >60 >60 mL/min/1.73 m^2    eGFR if non African American >60 >60  mL/min/1.73 m^2   Urinalysis Clean Catch   Result Value Ref Range    Specimen UA Urine, Clean Catch     Color, UA Yellow Yellow, Straw, Sabine    Appearance, UA Clear Clear    pH, UA 7.0 5.0 - 8.0    Specific Gravity, UA >=1.030 (A) 1.005 - 1.030    Protein, UA 3+ (A) Negative    Glucose, UA Negative Negative    Ketones, UA Negative Negative    Bilirubin (UA) 1+ (A) Negative    Occult Blood UA 2+ (A) Negative    Nitrite, UA Negative Negative    Urobilinogen, UA 2.0-3.0 (A) <2.0 EU/dL    Leukocytes, UA Negative Negative   Rapid Pregnancy, Urine   Result Value Ref Range    Preg Test, Ur Negative    Urinalysis Microscopic   Result Value Ref Range    RBC, UA 6 (H) 0 - 4 /hpf    WBC, UA 4 0 - 5 /hpf    Bacteria, UA Rare None-Occ /hpf    Squam Epithel, UA 20 /hpf    Hyaline Casts, UA 0 0-1/lpf /lpf    Microscopic Comment SEE COMMENT          Imaging Results:  Imaging Results          CT Renal Stone Study ABD Pelvis WO (Final result)  Result time 05/31/18 21:06:50    Final result by Vish Burgos MD (Timothy) (05/31/18 21:06:50)                 Impression:      The appear to be reticulonodular infiltrates involving the lower lobes left greater right.  Findings are suspicious for atypical pneumonia.    There is appears to be a large round pelvic mass containing focal fat.  Large dermoid measuring 7.5 x 6.2 cm is suspected.    Small amount of free fluid in the pelvis.    No acute bowel abnormalities.      Electronically signed by: Vish Burgos MD  Date:    05/31/2018  Time:    21:06             Narrative:    EXAMINATION:  CT RENAL STONE STUDY ABD PELVIS WO    CLINICAL HISTORY:  Hematuria;    TECHNIQUE:  Low dose axial images, sagittal and coronal reformations were obtained from the lung bases to the pubic symphysis.  Contrast was not administered.    COMPARISON:  None    FINDINGS:  This reticulonodular infiltrate identified at the left base.  Also appears to be some mild focal reticulonodular infiltrate at the right base  and in the right middle lobe.  Findings are suspicious for atypical pneumonia.    The liver appears unremarkable.  The no focal abnormalities in the spleen.  Gallbladder appears unremarkable.  Pancreas is unremarkable.  The    Kidneys appear unremarkable.    No acute bowel abnormalities.  Normal appendix.    Large round pelvic mass on the left anteriorly measuring 7.5 x 6.2 cm.  It contains a 2 cm fat nodule.  This likely represents a large dermoid cyst.    Small amount free fluid the pelvis.    Skeleton is intact.                                        The Emergency Provider reviewed the vital signs and test results, which are outlined above.    ED Discussion     9:30 PM: Reassessed pt at this time. Discussed with pt all pertinent ED information and results. Discussed pt dx and plan of tx. Gave pt all f/u and return to the ED instructions. All questions and concerns were addressed at this time. Pt expresses understanding of information and instructions, and is comfortable with plan to discharge. Pt is stable for discharge.  '  I discussed with patient and/or family/caretaker that evaluation in the ED does not suggest any emergent or life threatening medical conditions requiring immediate intervention beyond what was provided in the ED, and I believe patient is safe for discharge.  Regardless, an unremarkable evaluation in the ED does not preclude the development or presence of a serious of life threatening condition. As such, patient was instructed to return immediately for any worsening or change in current symptoms.      ED Medication(s):  Medications - No data to display    New Prescriptions    ACETAMINOPHEN-CODEINE 300-30MG (TYLENOL #3) 300-30 MG TAB    Take 1 tablet by mouth every 6 (six) hours as needed.    IBUPROFEN (ADVIL,MOTRIN) 600 MG TABLET    Take 1 tablet (600 mg total) by mouth every 6 (six) hours as needed.    LEVOFLOXACIN (LEVAQUIN) 500 MG TABLET    Take 1 tablet (500 mg total) by mouth once daily.        Follow-up Information     [facility] In 3 days.    Contact information:  [address] [facility] [address] [phone]             [facility] .    Specialty:  Emergency Medicine  Why:  If symptoms worsen  Contact information:  [address] [facility] [address] [phone]                   Medical Decision Making    Medical Decision Making:   Clinical Tests:   Lab Tests: Ordered and Reviewed  Radiological Study: Ordered and Reviewed           Scribe Attestation:   Scribe #1: I performed the above scribed service and the documentation accurately describes the services I performed. I attest to the accuracy of the note.    Attending:   Physician Attestation Statement for Scribe #1: I, Viktor Bautista NP, personally performed the services described in this documentation, as scribed by Hemalatha Posada, in my presence, and it is both accurate and complete.          Clinical Impression       ICD-10-CM ICD-9-CM   1. Acute cystitis with hematuria N30.01 595.0   2. Atypical pneumonia J18.9 486   3. RLQ abdominal pain R10.31 789.03       Disposition:   Disposition: Discharged  Condition: Stable         Viktor Bautista Jr., FNP  06/01/18 3186

## 2018-06-29 NOTE — PROVIDER PROGRESS NOTES - EMERGENCY DEPT.
Encounter Date: 5/31/2018    ED Physician Progress Notes        Physician Note:   Reviewing CT reports that have showed up in the radiology pool.  This is CT from May 31, 2018.  There appears to be a large 7 cm pelvic mass. On review of the chart, is unclear if patient is aware of this finding.  Therefore will ask the nursing staff to contact the patient to discuss the pelvic mass that was identified recommend that she follow up with OB.    CT report follows    The appear to be reticulonodular infiltrates involving the lower lobes left greater right.  Findings are suspicious for atypical pneumonia.     There is appears to be a large round pelvic mass containing focal fat.  Large dermoid measuring 7.5 x 6.2 cm is suspected.     Small amount of free fluid in the pelvis.     No acute bowel abnormalities.        Electronically signed by:     Vish Burgos MD  Date:                                   05/31/2018  Time:                                   21:06            Narrative:      EXAMINATION:  CT RENAL STONE STUDY ABD PELVIS WO     CLINICAL HISTORY:  Hematuria;     TECHNIQUE:  Low dose axial images, sagittal and coronal reformations were obtained from the lung bases to the pubic symphysis.  Contrast was not administered.     COMPARISON:  None     FINDINGS:  This reticulonodular infiltrate identified at the left base.  Also appears to be some mild focal reticulonodular infiltrate at the right base and in the right middle lobe.  Findings are suspicious for atypical pneumonia.     The liver appears unremarkable.  The no focal abnormalities in the spleen.  Gallbladder appears unremarkable.  Pancreas is unremarkable.  The     Kidneys appear unremarkable.     No acute bowel abnormalities.  Normal appendix.     Large round pelvic mass on the left anteriorly measuring 7.5 x 6.2 cm.  It contains a 2 cm fat nodule.  This likely represents a large dermoid cyst.     Small amount free fluid the pelvis.     Skeleton is  intact.

## 2018-06-30 ENCOUNTER — TELEPHONE (OUTPATIENT)
Dept: EMERGENCY MEDICINE | Facility: HOSPITAL | Age: 28
End: 2018-06-30

## 2018-06-30 NOTE — TELEPHONE ENCOUNTER
----- Message from Sindi Dexter DO sent at 6/29/2018  4:58 PM CDT -----  Please contact the patient and discussed the pelvic mass that was found on a CT performed on May 31, 2018.  I am not sure if she is aware of this finding.  Recommend she follow up with OB.      Of note, I am not sure why this result reappeared in the radiology review the pool.  I know this is almost greater than a month out.    This is my note that I put in her chart. . .  Reviewing CT reports that have showed up in the radiology pool.  This is CT from May 31, 2018.  There appears to be a large 7 cm pelvic mass. On review of the chart, is unclear if patient is aware of this finding.  Therefore will ask the nursing staff to contact the patient to discuss the pelvic mass that was identified recommend that she follow up with OB.    CT report follows    The appear to be reticulonodular infiltrates involving the lower lobes left greater right.  Findings are suspicious for atypical pneumonia.     There is appears to be a large round pelvic mass containing focal fat.  Large dermoid measuring 7.5 x 6.2 cm is suspected.     Small amount of free fluid in the pelvis.     No acute bowel abnormalities.        Electronically signed by:     Vish Burgos MD  Date:                                   05/31/2018  Time:                                   21:06            Narrative:      EXAMINATION:  CT RENAL STONE STUDY ABD PELVIS WO     CLINICAL HISTORY:  Hematuria;     TECHNIQUE:  Low dose axial images, sagittal and coronal reformations were obtained from the lung bases to the pubic symphysis.  Contrast was not administered.     COMPARISON:  None     FINDINGS:  This reticulonodular infiltrate identified at the left base.  Also appears to be some mild focal reticulonodular infiltrate at the right base and in the right middle lobe.  Findings are suspicious for atypical pneumonia.     The liver appears unremarkable.  The no focal abnormalities in the  spleen.  Gallbladder appears unremarkable.  Pancreas is unremarkable.  The     Kidneys appear unremarkable.     No acute bowel abnormalities.  Normal appendix.     Large round pelvic mass on the left anteriorly measuring 7.5 x 6.2 cm.  It contains a 2 cm fat nodule.  This likely represents a large dermoid cyst.     Small amount free fluid the pelvis.     Skeleton is intact.

## 2018-07-09 ENCOUNTER — DOCUMENTATION ONLY (OUTPATIENT)
Dept: GYNECOLOGIC ONCOLOGY | Facility: CLINIC | Age: 28
End: 2018-07-09

## 2018-07-09 NOTE — PROGRESS NOTES
Patient presented to the [facility] Emergency room on June 5, 2018 with complaints of right-sided abdominal pain.  It had started that day.    A renal stone CT was performed. This showed reticular low nodular infiltrates involving the lower lobes of the lung left > than right.  Suspicious for atypical pneumonia.  There was also a round pelvic mass containing focal fat.  Thought to be a dermoid cyst measuring 7.5 x 6.2 cm.    At the time of discharge from the emergency room the patient was started on Levaquin 500 mg daily.  She was also given a prescription for Tylenol 3.

## 2018-09-18 ENCOUNTER — HOSPITAL ENCOUNTER (EMERGENCY)
Facility: HOSPITAL | Age: 28
Discharge: HOME | End: 2018-09-19
Attending: EMERGENCY MEDICINE
Payer: MEDICAID

## 2018-09-18 DIAGNOSIS — A53.0 POSITIVE RPR TEST: ICD-10-CM

## 2018-09-18 DIAGNOSIS — N73.0 PID (ACUTE PELVIC INFLAMMATORY DISEASE): ICD-10-CM

## 2018-09-18 DIAGNOSIS — Z86.19 HISTORY OF SYPHILIS: ICD-10-CM

## 2018-09-18 DIAGNOSIS — N89.8 VAGINAL LESION: Primary | ICD-10-CM

## 2018-09-18 DIAGNOSIS — B00.9 HSV-2 (HERPES SIMPLEX VIRUS 2) INFECTION: ICD-10-CM

## 2018-09-18 LAB
B-HCG UR QL: NEGATIVE
BACTERIA #/AREA URNS HPF: ABNORMAL /HPF
BILIRUB UR QL STRIP: NEGATIVE
CLARITY UR: CLEAR
COLOR UR: YELLOW
GLUCOSE UR QL STRIP: NEGATIVE
HGB UR QL STRIP: ABNORMAL
HIV1+2 IGG SERPL QL IA.RAPID: POSITIVE
HYALINE CASTS #/AREA URNS LPF: 0 /LPF
KETONES UR QL STRIP: NEGATIVE
LEUKOCYTE ESTERASE UR QL STRIP: NEGATIVE
MICROSCOPIC COMMENT: ABNORMAL
NITRITE UR QL STRIP: NEGATIVE
PH UR STRIP: 7 [PH] (ref 5–8)
PROT UR QL STRIP: ABNORMAL
RBC #/AREA URNS HPF: 10 /HPF (ref 0–4)
SP GR UR STRIP: >=1.03 (ref 1–1.03)
SQUAMOUS #/AREA URNS HPF: 5 /HPF
URN SPEC COLLECT METH UR: ABNORMAL
UROBILINOGEN UR STRIP-ACNC: 1 EU/DL
WBC #/AREA URNS HPF: 20 /HPF (ref 0–5)

## 2018-09-18 PROCEDURE — 86780 TREPONEMA PALLIDUM: CPT

## 2018-09-18 PROCEDURE — 87086 URINE CULTURE/COLONY COUNT: CPT

## 2018-09-18 PROCEDURE — 86703 HIV-1/HIV-2 1 RESULT ANTBDY: CPT

## 2018-09-18 PROCEDURE — 87210 SMEAR WET MOUNT SALINE/INK: CPT

## 2018-09-18 PROCEDURE — 81000 URINALYSIS NONAUTO W/SCOPE: CPT

## 2018-09-18 PROCEDURE — 87529 HSV DNA AMP PROBE: CPT

## 2018-09-18 PROCEDURE — 86593 SYPHILIS TEST NON-TREP QUANT: CPT

## 2018-09-18 PROCEDURE — 81025 URINE PREGNANCY TEST: CPT

## 2018-09-18 PROCEDURE — 86703 HIV-1/HIV-2 1 RESULT ANTBDY: CPT | Mod: 91

## 2018-09-18 PROCEDURE — 87389 HIV-1 AG W/HIV-1&-2 AB AG IA: CPT

## 2018-09-18 PROCEDURE — 99284 EMERGENCY DEPT VISIT MOD MDM: CPT | Mod: 25

## 2018-09-18 PROCEDURE — 86592 SYPHILIS TEST NON-TREP QUAL: CPT

## 2018-09-18 PROCEDURE — 96372 THER/PROPH/DIAG INJ SC/IM: CPT

## 2018-09-18 PROCEDURE — 87491 CHLMYD TRACH DNA AMP PROBE: CPT

## 2018-09-19 VITALS
WEIGHT: 136.81 LBS | OXYGEN SATURATION: 95 % | TEMPERATURE: 98 F | HEIGHT: 66 IN | SYSTOLIC BLOOD PRESSURE: 121 MMHG | RESPIRATION RATE: 16 BRPM | BODY MASS INDEX: 21.99 KG/M2 | HEART RATE: 74 BPM | DIASTOLIC BLOOD PRESSURE: 66 MMHG

## 2018-09-19 LAB
BACTERIA GENITAL QL WET PREP: ABNORMAL
CLUE CELLS VAG QL WET PREP: ABNORMAL
FILAMENT FUNGI VAG WET PREP-#/AREA: ABNORMAL
RPR SER QL: REACTIVE
RPR SER-TITR: ABNORMAL {TITER}
SPECIMEN SOURCE: ABNORMAL
T VAGINALIS GENITAL QL WET PREP: ABNORMAL
WBC #/AREA VAG WET PREP: ABNORMAL
YEAST GENITAL QL WET PREP: ABNORMAL

## 2018-09-19 PROCEDURE — 36415 COLL VENOUS BLD VENIPUNCTURE: CPT

## 2018-09-19 PROCEDURE — 25000003 PHARM REV CODE 250: Performed by: NURSE PRACTITIONER

## 2018-09-19 PROCEDURE — 63600175 PHARM REV CODE 636 W HCPCS: Performed by: NURSE PRACTITIONER

## 2018-09-19 RX ORDER — CEFTRIAXONE 250 MG/1
250 INJECTION, POWDER, FOR SOLUTION INTRAMUSCULAR; INTRAVENOUS
Status: COMPLETED | OUTPATIENT
Start: 2018-09-19 | End: 2018-09-19

## 2018-09-19 RX ORDER — DOXYCYCLINE HYCLATE 100 MG
100 TABLET ORAL
Status: COMPLETED | OUTPATIENT
Start: 2018-09-19 | End: 2018-09-19

## 2018-09-19 RX ORDER — BUTALBITAL, ACETAMINOPHEN AND CAFFEINE 50; 325; 40 MG/1; MG/1; MG/1
1 TABLET ORAL
Status: COMPLETED | OUTPATIENT
Start: 2018-09-19 | End: 2018-09-19

## 2018-09-19 RX ORDER — DOXYCYCLINE 100 MG/1
100 CAPSULE ORAL 2 TIMES DAILY
Qty: 56 CAPSULE | Refills: 0 | Status: SHIPPED | OUTPATIENT
Start: 2018-09-19 | End: 2018-10-17

## 2018-09-19 RX ORDER — VALACYCLOVIR HYDROCHLORIDE 1 G/1
1000 TABLET, FILM COATED ORAL 3 TIMES DAILY
Qty: 21 TABLET | Refills: 0 | Status: ON HOLD | OUTPATIENT
Start: 2018-09-19 | End: 2023-08-30 | Stop reason: HOSPADM

## 2018-09-19 RX ORDER — VALACYCLOVIR HYDROCHLORIDE 500 MG/1
1000 TABLET, FILM COATED ORAL
Status: COMPLETED | OUTPATIENT
Start: 2018-09-19 | End: 2018-09-19

## 2018-09-19 RX ADMIN — CEFTRIAXONE SODIUM 250 MG: 250 INJECTION, POWDER, FOR SOLUTION INTRAMUSCULAR; INTRAVENOUS at 02:09

## 2018-09-19 RX ADMIN — BUTALBITAL, ACETAMINOPHEN, AND CAFFEINE 1 TABLET: 50; 325; 40 TABLET ORAL at 12:09

## 2018-09-19 RX ADMIN — DOXYCYCLINE HYCLATE 100 MG: 100 TABLET, COATED ORAL at 03:09

## 2018-09-19 RX ADMIN — VALACYCLOVIR HYDROCHLORIDE 1000 MG: 500 TABLET, FILM COATED ORAL at 02:09

## 2018-09-19 NOTE — ED PROVIDER NOTES
SCRIBE #1 NOTE: I, Brando Aguero, am scribing for, and in the presence of, Todd Kemp NP. I have scribed the entire note.      History      Chief Complaint   Patient presents with    Female  Problem     and rash on hands       Review of patient's allergies indicates:  No Known Allergies     HPI   HPI    9/18/2018, 9:41 PM   History obtained from the patient      History of Present Illness: Yocasta Szymanski is a 28 y.o. female patient who presents to the Emergency Department for an evaluation of a rash to her hands which onset gradually a few days ago. Pt reports she thinks the rash is spreading. Pt denies having been evaluated for the rash by another r provider. Symptoms are constant and moderate in severity. Exacerbated by nothing and relieved by nothing. Associated sxs include a bump on pt's vagina . Patient denies any fever, chills, CP, SOB, abd pain, N/V, SOB, trouble wallowing, facial swelling, HA, weakness/numbness, neck pain/stiffness, and all other sxs at this time. No further complaints or concerns at this time.       Arrival mode: Personal vehicle    PCP: Primary Doctor No       Past Medical History:  Past medical history reviewed not relevant      Past Surgical History:  Past surgical history reviewed not relevant      Family History:  Family history reviewed not relevant      Social History:  Social History    Social History Main Topics    Social History Main Topics    Smoking status: Unknown if ever smoked    Smokeless tobacco: Unknown if ever used    Alcohol Use: Unknown drinking history    Drug Use: Unknown if ever used    Sexual Activity: Unknown       Social History:  Social History     Tobacco Use    Smoking status: Never Smoker   Substance and Sexual Activity    Alcohol use: No    Drug use: No    Sexual activity: Unknown       ROS   Review of Systems   Constitutional: Negative for chills and fever.   HENT: Negative for sore throat.    Respiratory: Negative for cough and  shortness of breath.    Cardiovascular: Negative for chest pain.   Gastrointestinal: Negative for abdominal pain, nausea and vomiting.   Genitourinary: Negative for dysuria, frequency, hematuria, urgency, vaginal bleeding and vaginal discharge.        (+) Bump on vagina   Musculoskeletal: Negative for back pain.   Skin: Positive for rash (Hands). Negative for pallor and wound.   Neurological: Negative for dizziness, weakness, light-headedness, numbness and headaches.   Hematological: Does not bruise/bleed easily.     Physical Exam      Initial Vitals [09/18/18 2050]   BP Pulse Resp Temp SpO2   (!) 153/91 86 19 98.2 °F (36.8 °C) 95 %      MAP       --          Physical Exam  Nursing Notes and Vital Signs Reviewed.  Constitutional: Patient is in no acute distress. Well-developed and well-nourished.  Head: Atraumatic. Normocephalic.  Eyes: PERRL. EOM intact. Conjunctivae are not pale. No scleral icterus.  ENT: Mucous membranes are moist. Oropharynx is clear and symmetric.    Neck: Supple. Full ROM. No lymphadenopathy.  Cardiovascular: Regular rate. Regular rhythm.  Pulmonary/Chest: No respiratory distress.  Abdominal: Soft and non-distended.  There is no tenderness.     Pelvic: A female chaperone was present for this examination. exteranal lesion to left labia minora. Cervical os is closed. There is mildCMT. There is no blood in the vaginal vault. Grey/white discharge. No adnexal tenderness. No adnexal masses.      Musculoskeletal: Moves all extremities. No obvious deformities. No edema. No calf tenderness. Rash to the dorsal and palmar sides of bilateral hands. Rash noted to fingers and webbing of hands.   Skin: Warm and dry.  Neurological:  Alert, awake, and appropriate.  Normal speech.  No acute focal neurological deficits are appreciated.  Psychiatric: Normal affect. Good eye contact. Appropriate in content.    ED Course    Procedures  ED Vital Signs:  Vitals:    09/18/18 2050 09/19/18 0310   BP: (!) 153/91 121/66  "  Pulse: 86 74   Resp: 19 16   Temp: 98.2 °F (36.8 °C)    TempSrc: Oral    SpO2: 95% 95%   Weight: 62.1 kg (136 lb 12.7 oz)    Height: 5' 6" (1.676 m)        Abnormal Lab Results:  Labs Reviewed   RPR - Abnormal; Notable for the following components:       Result Value    RPR Reactive (*)     All other components within normal limits   RAPID HIV - Abnormal; Notable for the following components:    HIV Rapid Testing Positive (*)     All other components within normal limits   URINALYSIS, REFLEX TO URINE CULTURE - Abnormal; Notable for the following components:    Specific Gravity, UA >=1.030 (*)     Protein, UA 3+ (*)     Occult Blood UA 2+ (*)     All other components within normal limits    Narrative:     Preferred Collection Type->Urine, Clean Catch   VAGINAL SCREEN - Abnormal; Notable for the following components:    Clue Cells, Wet Prep Many (*)     WBC - Vaginal Screen Few (*)     Bacteria - Vaginal Screen Many (*)     All other components within normal limits   URINALYSIS MICROSCOPIC - Abnormal; Notable for the following components:    RBC, UA 10 (*)     WBC, UA 20 (*)     Bacteria, UA Moderate (*)     All other components within normal limits    Narrative:     Preferred Collection Type->Urine, Clean Catch   RPR, QUANTITATIVE - Abnormal; Notable for the following components:    RPR Quantitative 1:16 (*)     All other components within normal limits   C. TRACHOMATIS/N. GONORRHOEAE BY AMP DNA   CULTURE, URINE   PREGNANCY TEST, URINE RAPID   HERPES SIMPLEX (HSV) BY RAPID PCR, NON-BLOOD    Narrative:     Receiving Lab?->[facility]   HIV 1 / 2 ANTIBODY   FTA ANTIBODIES, IGG AND IGM        All Lab Results:  Results for orders placed or performed during the hospital encounter of 09/18/18   RPR   Result Value Ref Range    RPR Reactive (A) Non-reactive   Rapid HIV   Result Value Ref Range    HIV Rapid Testing Positive (A) Negative   Urinalysis, Reflex to Urine Culture Urine, Clean Catch   Result Value Ref Range    " Specimen UA Urine, Clean Catch     Color, UA Yellow Yellow, Straw, Sabine    Appearance, UA Clear Clear    pH, UA 7.0 5.0 - 8.0    Specific Gravity, UA >=1.030 (A) 1.005 - 1.030    Protein, UA 3+ (A) Negative    Glucose, UA Negative Negative    Ketones, UA Negative Negative    Bilirubin (UA) Negative Negative    Occult Blood UA 2+ (A) Negative    Nitrite, UA Negative Negative    Urobilinogen, UA 1.0 <2.0 EU/dL    Leukocytes, UA Negative Negative   Rapid Pregnancy, Urine   Result Value Ref Range    Preg Test, Ur Negative    HSV by Rapid PCR, Non-Blood [facility]; Vagina   Result Value Ref Range    HSV PCR Source Vagina    Vaginal Screen Vagina   Result Value Ref Range    Trichomonas None None    Clue Cells, Wet Prep Many (A) None    Budding Yeast None None    Fungal Hyphae None None    WBC - Vaginal Screen Few (A) None    Bacteria - Vaginal Screen Many (A) None    Wet Prep Source Vagina None   Urinalysis Microscopic   Result Value Ref Range    RBC, UA 10 (H) 0 - 4 /hpf    WBC, UA 20 (H) 0 - 5 /hpf    Bacteria, UA Moderate (A) None-Occ /hpf    Squam Epithel, UA 5 /hpf    Hyaline Casts, UA 0 0-1/lpf /lpf    Microscopic Comment SEE COMMENT    RPR, Quantitative   Result Value Ref Range    RPR Quantitative 1:16 (A)        Imaging Results:  Imaging Results    None                 The Emergency Provider reviewed the vital signs and test results, which are outlined above.    ED Discussion      Reassessed pt at this time. Pt is awake, alert, and in no distress. Discussed with pt all pertinent ED information and results. Discussed pt dx and plan of tx. Gave pt all f/u and return to the ED instructions. All questions and concerns were addressed at this time. Pt expresses understanding of information and instructions, and is comfortable with plan to discharge. Pt is stable for discharge.    I discussed with patient and/or family/caretaker that evaluation in the ED does not suggest any emergent or life threatening medical conditions  requiring immediate intervention beyond what was provided in the ED, and I believe patient is safe for discharge.  Regardless, an unremarkable evaluation in the ED does not preclude the development or presence of a serious of life threatening condition. As such, patient was instructed to return immediately for any worsening or change in current symptoms.    PATIENT TO SEE Infectious disease doctor later today.  ED Medication(s):  Medications   butalbital-acetaminophen-caffeine -40 mg per tablet 1 tablet (1 tablet Oral Given 9/19/18 0037)   doxycycline tablet 100 mg (100 mg Oral Given 9/19/18 0310)   valACYclovir tablet 1,000 mg (1,000 mg Oral Given 9/19/18 0259)   cefTRIAXone injection 250 mg (250 mg Intramuscular Given 9/19/18 0259)       Follow-up Information     [facility] - .    Specialty:  Emergency Medicine  Why:  As needed, If symptoms worsen  Contact information:  [address] [facility] [address] [phone]           Schedule an appointment as soon as possible for a visit  with Infectious Disease.                   Medical Decision Making    Medical Decision Making:   Clinical Tests:   Lab Tests: Ordered and Reviewed           Scribe Attestation:   Scribe #1: I performed the above scribed service and the documentation accurately describes the services I performed. I attest to the accuracy of the note.    Attending:   Physician Attestation Statement for Scribe #1: I, Todd Kemp NP, personally performed the services described in this documentation, as scribed by Brando Aguero, in my presence, and it is both accurate and complete.          Clinical Impression       ICD-10-CM ICD-9-CM   1. Vaginal lesion N89.8 623.8   2. Positive RPR test A53.0 097.1   3. History of syphilis Z86.19 V12.09   4. HSV-2 (herpes simplex virus 2) infection B00.9 054.9   5. PID (acute pelvic inflammatory disease) N73.0 614.3       Disposition:   Disposition: Discharged  Condition:  Carolina Kemp, KEYLA  09/19/18 7381

## 2018-09-20 LAB
BACTERIA UR CULT: NORMAL
BACTERIA UR CULT: NORMAL
C TRACH DNA SPEC QL NAA+PROBE: NOT DETECTED
N GONORRHOEA DNA SPEC QL NAA+PROBE: NOT DETECTED

## 2018-09-21 LAB
HSV1 DNA SPEC QL NAA+PROBE: NEGATIVE
HSV2 DNA SPEC QL NAA+PROBE: POSITIVE
SPECIMEN SOURCE: ABNORMAL

## 2018-09-24 LAB — T PALLIDUM AB SER QL IF: REACTIVE

## 2018-09-25 LAB
HIV 1+2 AB+HIV1 P24 AG SERPL QL IA: POSITIVE
HIV SUPPLEMENTAL ASSAY INTERPRETATION: ABNORMAL
HIV-1 RESULT: POSITIVE
HIV-2 RESULT: NEGATIVE

## 2020-02-29 ENCOUNTER — HOSPITAL ENCOUNTER (EMERGENCY)
Facility: HOSPITAL | Age: 30
Discharge: HOME | End: 2020-02-29
Attending: EMERGENCY MEDICINE
Payer: MEDICAID

## 2020-02-29 VITALS
WEIGHT: 148.38 LBS | BODY MASS INDEX: 23.84 KG/M2 | DIASTOLIC BLOOD PRESSURE: 63 MMHG | HEART RATE: 75 BPM | SYSTOLIC BLOOD PRESSURE: 101 MMHG | RESPIRATION RATE: 20 BRPM | HEIGHT: 66 IN | OXYGEN SATURATION: 100 % | TEMPERATURE: 98 F

## 2020-02-29 DIAGNOSIS — N39.0 URINARY TRACT INFECTION WITHOUT HEMATURIA, SITE UNSPECIFIED: ICD-10-CM

## 2020-02-29 DIAGNOSIS — S20.211A RIB CONTUSION, RIGHT, INITIAL ENCOUNTER: Primary | ICD-10-CM

## 2020-02-29 DIAGNOSIS — R07.81 RIB PAIN ON RIGHT SIDE: ICD-10-CM

## 2020-02-29 LAB
B-HCG UR QL: NEGATIVE
BACTERIA #/AREA URNS HPF: NORMAL /HPF
BILIRUB UR QL STRIP: NEGATIVE
CLARITY UR: CLEAR
COLOR UR: YELLOW
GLUCOSE UR QL STRIP: NEGATIVE
HGB UR QL STRIP: ABNORMAL
KETONES UR QL STRIP: NEGATIVE
LEUKOCYTE ESTERASE UR QL STRIP: NEGATIVE
MICROSCOPIC COMMENT: NORMAL
NITRITE UR QL STRIP: POSITIVE
PH UR STRIP: 7 [PH] (ref 5–8)
PROT UR QL STRIP: ABNORMAL
RBC #/AREA URNS HPF: 1 /HPF (ref 0–4)
SP GR UR STRIP: 1.02 (ref 1–1.03)
SQUAMOUS #/AREA URNS HPF: 3 /HPF
URN SPEC COLLECT METH UR: ABNORMAL
UROBILINOGEN UR STRIP-ACNC: NEGATIVE EU/DL
WBC #/AREA URNS HPF: 1 /HPF (ref 0–5)

## 2020-02-29 PROCEDURE — 81025 URINE PREGNANCY TEST: CPT

## 2020-02-29 PROCEDURE — 81000 URINALYSIS NONAUTO W/SCOPE: CPT

## 2020-02-29 PROCEDURE — 96372 THER/PROPH/DIAG INJ SC/IM: CPT

## 2020-02-29 PROCEDURE — 99284 EMERGENCY DEPT VISIT MOD MDM: CPT | Mod: 25

## 2020-02-29 PROCEDURE — 63600175 PHARM REV CODE 636 W HCPCS: Performed by: EMERGENCY MEDICINE

## 2020-02-29 RX ORDER — KETOROLAC TROMETHAMINE 30 MG/ML
30 INJECTION, SOLUTION INTRAMUSCULAR; INTRAVENOUS
Status: COMPLETED | OUTPATIENT
Start: 2020-02-29 | End: 2020-02-29

## 2020-02-29 RX ORDER — NAPROXEN 500 MG/1
500 TABLET ORAL 2 TIMES DAILY WITH MEALS
Qty: 20 TABLET | Refills: 0 | Status: SHIPPED | OUTPATIENT
Start: 2020-02-29 | End: 2020-03-10

## 2020-02-29 RX ORDER — SULFAMETHOXAZOLE AND TRIMETHOPRIM 800; 160 MG/1; MG/1
1 TABLET ORAL 2 TIMES DAILY
Qty: 6 TABLET | Refills: 0 | Status: SHIPPED | OUTPATIENT
Start: 2020-02-29 | End: 2020-03-03

## 2020-02-29 RX ADMIN — KETOROLAC TROMETHAMINE 30 MG: 30 INJECTION, SOLUTION INTRAMUSCULAR at 01:02

## 2020-02-29 NOTE — DISCHARGE INSTRUCTIONS
For RIB CONTUSION, I recommended that the patient perform deep breathing exercises to help prevent pneumonia (take 10 deep breaths every hour while awake); brace ribs with hands or a pillow while coughing or deep breathing to help decrease the pain; allow for adequate amount of rest to to decrease swelling and allow the injury to heal faster; avoid activities that may cause more pain or damage to ribs; apply ice packs to help decrease swelling and pain and prevent tissue damage (use an ice pack or put crushed ice in a plastic bag and cover it with a towel and place it on injured area for 15 to 20 minutes every hour as directed).  I instructed patient to contact primary healthcare provider if they: develop a fever; notice increased bruising on chest; have chills and increased coughing; or have any questions or concerns about condition.  Advised patient to return to emergency department or call 911 if they suddenly have more severe chest pain; cough up blood; experience fever or chills and increased shortness of breath; have abdominal pain; suddenly feel lightheaded and short of breath, or notice tenderness and warmth to the arms or legs. Patient also instructed to take medications as prescribed and to avoid operating heavy machinery or driving while taking muscle relaxer's or pain medications.    Regarding CONTUSIONS, I advised patient to: REST the injured area or use it less than usual; apply ICE to decrease swelling and pain and help prevent tissue damage; use COMPRESSION with an elastic bandage to support the area and decrease swelling; ELEVATE injured body part above the level of the heart to help decrease pain and swelling; AVOID using massage or massage to acute injuries as it may slow healing of the area; AVOID drinking alcohol as it may slow healing of the injury; and avoid stretching injured muscles. Advised patient to return to the emergency department or contact primary care provider if: having trouble  moving injured area; notice tingling or numbness in or near the injured area; extremity below the bruise gets cold or turns pale; a new lump develops in the injured area; symptoms do not improve with treatment after 4 to 5 days; there is any questions or concerns about the condition or treatment plan.     For URINARY TRACT INFECTION, I instructed patient to avoid holding in urine and recommended that she urinate when she feels the urge.  Also advised patient to drink plenty of liquids and reminded patient that she may need to drink more fluids than usual to help flush out the bacteria. Instructed patient to avoid alcohol, caffeine, and citrus juices as these substances can irritate your bladder and increase your symptoms.  Also recommended that patient apply heat to lower abdomen for 20 to 30 minutes every two hours to help decrease discomfort and pressure in the bladder.
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
## 2021-10-20 ENCOUNTER — LAB VISIT (OUTPATIENT)
Dept: PRIMARY CARE CLINIC | Facility: OTHER | Age: 31
End: 2021-10-20
Attending: INTERNAL MEDICINE
Payer: MEDICAID

## 2021-10-20 DIAGNOSIS — Z20.822 ENCOUNTER FOR LABORATORY TESTING FOR COVID-19 VIRUS: ICD-10-CM

## 2021-10-20 PROCEDURE — U0003 INFECTIOUS AGENT DETECTION BY NUCLEIC ACID (DNA OR RNA); SEVERE ACUTE RESPIRATORY SYNDROME CORONAVIRUS 2 (SARS-COV-2) (CORONAVIRUS DISEASE [COVID-19]), AMPLIFIED PROBE TECHNIQUE, MAKING USE OF HIGH THROUGHPUT TECHNOLOGIES AS DESCRIBED BY CMS-2020-01-R: HCPCS | Performed by: INTERNAL MEDICINE

## 2021-10-21 LAB
SARS-COV-2 RNA RESP QL NAA+PROBE: NOT DETECTED
SARS-COV-2- CYCLE NUMBER: NORMAL

## 2023-08-26 PROBLEM — R53.83 FATIGUE: Status: ACTIVE | Noted: 2023-08-26

## 2023-08-26 PROBLEM — B20 HIV DISEASE: Status: ACTIVE | Noted: 2023-08-26

## 2023-08-26 PROBLEM — Z13.9 ENCOUNTER FOR MEDICAL SCREENING EXAMINATION: Status: ACTIVE | Noted: 2023-08-26

## 2023-08-26 PROBLEM — F43.23 ADJUSTMENT DISORDER WITH MIXED ANXIETY AND DEPRESSED MOOD: Status: ACTIVE | Noted: 2023-08-26

## 2023-08-28 PROBLEM — F43.20 GRIEF REACTION: Status: ACTIVE | Noted: 2023-08-28

## 2023-08-28 PROBLEM — F43.21 GRIEF REACTION: Status: ACTIVE | Noted: 2023-08-28

## 2023-11-27 PROBLEM — Z13.9 ENCOUNTER FOR MEDICAL SCREENING EXAMINATION: Status: RESOLVED | Noted: 2023-08-26 | Resolved: 2023-11-27

## 2023-12-31 ENCOUNTER — HOSPITAL ENCOUNTER (EMERGENCY)
Facility: HOSPITAL | Age: 33
Discharge: HOME | End: 2023-12-31
Attending: EMERGENCY MEDICINE
Payer: MEDICAID

## 2023-12-31 VITALS
WEIGHT: 180.13 LBS | SYSTOLIC BLOOD PRESSURE: 138 MMHG | RESPIRATION RATE: 16 BRPM | HEART RATE: 92 BPM | OXYGEN SATURATION: 100 % | BODY MASS INDEX: 28.95 KG/M2 | TEMPERATURE: 99 F | HEIGHT: 66 IN | DIASTOLIC BLOOD PRESSURE: 92 MMHG

## 2023-12-31 DIAGNOSIS — N76.4 LABIAL ABSCESS: Primary | ICD-10-CM

## 2023-12-31 PROCEDURE — 25000003 PHARM REV CODE 250: Performed by: NURSE PRACTITIONER

## 2023-12-31 PROCEDURE — 56405 I&D VULVA/PERINEAL ABSCESS: CPT

## 2023-12-31 PROCEDURE — 99284 EMERGENCY DEPT VISIT MOD MDM: CPT | Mod: 25

## 2023-12-31 RX ORDER — LIDOCAINE HYDROCHLORIDE 10 MG/ML
10 INJECTION, SOLUTION EPIDURAL; INFILTRATION; INTRACAUDAL; PERINEURAL
Status: COMPLETED | OUTPATIENT
Start: 2023-12-31 | End: 2023-12-31

## 2023-12-31 RX ORDER — HYDROCODONE BITARTRATE AND ACETAMINOPHEN 5; 325 MG/1; MG/1
1 TABLET ORAL EVERY 4 HOURS PRN
Qty: 18 TABLET | Refills: 0 | Status: SHIPPED | OUTPATIENT
Start: 2023-12-31

## 2023-12-31 RX ORDER — SULFAMETHOXAZOLE AND TRIMETHOPRIM 800; 160 MG/1; MG/1
1 TABLET ORAL 2 TIMES DAILY
Qty: 14 TABLET | Refills: 0 | Status: SHIPPED | OUTPATIENT
Start: 2023-12-31 | End: 2024-01-07

## 2023-12-31 RX ADMIN — LIDOCAINE HYDROCHLORIDE 100 MG: 10 SOLUTION INTRAVENOUS at 09:12

## 2024-01-01 NOTE — ED PROVIDER NOTES
Encounter Date: 12/31/2023       History     Chief Complaint   Patient presents with    Abscess     Pt has abscess to groin area.Pt HIV +.      Patient is a 33-year-old female who presents with complaints of an abscess to the vaginal area.  Reports onset was 2 days ago.  Denies any drainage to the area.  Denies any fever, nausea or vomiting.  No distress noted at this time.      Review of patient's allergies indicates:  No Known Allergies  Past Medical History:   Diagnosis Date    HIV (human immunodeficiency virus infection)      No past surgical history on file.  No family history on file.  Social History     Tobacco Use    Smoking status: Never   Substance Use Topics    Alcohol use: No    Drug use: No     Review of Systems   Constitutional:  Negative for fever.   HENT:  Negative for sore throat.    Respiratory:  Negative for shortness of breath.    Cardiovascular:  Negative for chest pain.   Gastrointestinal:  Negative for nausea.   Genitourinary:  Negative for dysuria.   Musculoskeletal:  Negative for back pain.   Skin:  Positive for wound (Abscess of the vagina). Negative for rash.   Neurological:  Negative for weakness.   Hematological:  Does not bruise/bleed easily.       Physical Exam     Initial Vitals [12/31/23 2058]   BP Pulse Resp Temp SpO2   (!) 138/92 92 16 99.2 °F (37.3 °C) 100 %      MAP       --         Physical Exam    Nursing note and vitals reviewed.  Constitutional: She appears well-developed and well-nourished.   HENT:   Head: Normocephalic and atraumatic.   Eyes: EOM are normal. Pupils are equal, round, and reactive to light.   Neck: Neck supple.   Normal range of motion.  Cardiovascular:  Normal rate, regular rhythm, normal heart sounds and intact distal pulses.           Pulmonary/Chest: Breath sounds normal.   Abdominal: Abdomen is soft. Bowel sounds are normal.   Musculoskeletal:         General: Normal range of motion.      Cervical back: Normal range of motion and neck supple.      Neurological: She is alert and oriented to person, place, and time. She has normal strength and normal reflexes.   Skin: Skin is warm and dry. Abscess (noted to the left labia, moderate fluctuance and induration noted) noted.         ED Course   I & D - Incision and Drainage    Date/Time: 12/31/2023 9:41 PM  Location procedure was performed: [facility] EMERGENCY DEPARTMENT    Performed by: Bahman Griffin NP  Authorized by: Daryl Kyle MD  Type: abscess  Location: Vagina.    Anesthesia:  Local Anesthetic: lidocaine 1% without epinephrine  Scalpel size: 11  Incision type: single straight  Complexity: simple  Drainage: purulent  Drainage amount: copious  Wound treatment: incision, wound left open, deloculation and wound packed  Packing material: 1/4 in gauze  Patient tolerance: Patient tolerated the procedure well with no immediate complications        Labs Reviewed - No data to display       Imaging Results    None          Medications   LIDOcaine (PF) 10 mg/ml (1%) injection 100 mg (100 mg Infiltration Given 12/31/23 2115)     Medical Decision Making  Patient instructed to remove packing in 48 hours and to take all antibiotics as prescribed.  Patient to return to the emergency room with any worsening symptoms.  No distress noted at time of discharge.    Risk  Prescription drug management.                                      Clinical Impression:  Final diagnoses:  [N76.4] Labial abscess (Primary)          ED Disposition Condition    Discharge Stable          ED Prescriptions       Medication Sig Dispense Start Date End Date Auth. Provider    sulfamethoxazole-trimethoprim 800-160mg (BACTRIM DS) 800-160 mg Tab Take 1 tablet by mouth 2 (two) times daily. for 7 days 14 tablet 12/31/2023 1/7/2024 Bahman Griffin NP    HYDROcodone-acetaminophen (NORCO) 5-325 mg per tablet Take 1 tablet by mouth every 4 (four) hours as needed. 18 tablet 12/31/2023 -- Bahman Griffin NP          Follow-up Information    None           Bahman Griffin, NP  12/31/23 2140

## 2024-11-22 ENCOUNTER — HOSPITAL ENCOUNTER (EMERGENCY)
Facility: HOSPITAL | Age: 34
Discharge: HOME | End: 2024-11-22
Attending: EMERGENCY MEDICINE
Payer: MEDICAID

## 2024-11-22 VITALS
HEIGHT: 68 IN | OXYGEN SATURATION: 99 % | BODY MASS INDEX: 26.4 KG/M2 | SYSTOLIC BLOOD PRESSURE: 134 MMHG | WEIGHT: 174.19 LBS | DIASTOLIC BLOOD PRESSURE: 92 MMHG | TEMPERATURE: 98 F | RESPIRATION RATE: 16 BRPM | HEART RATE: 76 BPM

## 2024-11-22 DIAGNOSIS — S39.012A BACK STRAIN, INITIAL ENCOUNTER: Primary | ICD-10-CM

## 2024-11-22 LAB — B-HCG UR QL: NEGATIVE

## 2024-11-22 PROCEDURE — 81025 URINE PREGNANCY TEST: CPT | Performed by: NURSE PRACTITIONER

## 2024-11-22 PROCEDURE — 99284 EMERGENCY DEPT VISIT MOD MDM: CPT

## 2024-11-22 RX ORDER — TIZANIDINE 4 MG/1
4 TABLET ORAL EVERY 6 HOURS PRN
Qty: 20 TABLET | Refills: 0 | Status: SHIPPED | OUTPATIENT
Start: 2024-11-22 | End: 2024-12-02

## 2024-11-22 RX ORDER — NAPROXEN 375 MG/1
375 TABLET ORAL 2 TIMES DAILY WITH MEALS
Qty: 14 TABLET | Refills: 0 | Status: SHIPPED | OUTPATIENT
Start: 2024-11-22

## 2024-11-22 NOTE — ED PROVIDER NOTES
SCRIBE #1 NOTE: I, Sarah Barba, am scribing for, and in the presence of, Renan Mercado MD. I have scribed the entire note.       History     Chief Complaint   Patient presents with    Back Pain     Hurt lower back while pulling on boxes at work     Review of patient's allergies indicates:  No Known Allergies      History of Present Illness     HPI    11/22/2024, 2:31 AM  History obtained from the patient      History of Present Illness: Yocasta Szymanski is a 34 y.o. female patient with a PMHx of HIV who presents to the Emergency Department for evaluation of constant, moderate lumbar back pain which onset 3 days PTA. This pain is worse with movement. The patient states that the pain began at work while picking up a box from the ground. She has tried using heating pads and taking Tylenol and Ibuprofen without relief. Patient denies any numbness, weakness, fever, CP, SOB, cough, dysuria, and all other sxs at this time. No further complaints or concerns at this time.       Arrival mode: Personal vehicle    PCP: Ela, Primary Doctor        Past Medical History:  Past Medical History:   Diagnosis Date    HIV (human immunodeficiency virus infection)        Past Surgical History:  History reviewed. No pertinent surgical history.      Family History:  No family history on file.    Social History:  Social History     Tobacco Use    Smoking status: Never    Smokeless tobacco: Not on file   Substance and Sexual Activity    Alcohol use: No    Drug use: No    Sexual activity: Not on file        Review of Systems     Review of Systems   Constitutional:  Negative for fever.   HENT:  Negative for sore throat.    Respiratory:  Negative for shortness of breath.    Cardiovascular:  Negative for chest pain.   Gastrointestinal:  Negative for nausea and vomiting.   Genitourinary:  Negative for dysuria.   Musculoskeletal:  Positive for back pain (lumbar).   Skin:  Negative for rash.   Neurological:  Negative for weakness and  "numbness.   Hematological:  Does not bruise/bleed easily.   All other systems reviewed and are negative.     Physical Exam     Initial Vitals [11/22/24 0221]   BP Pulse Resp Temp SpO2   (!) 146/84 65 14 98 °F (36.7 °C) 100 %      MAP       --          Physical Exam  Nursing Notes and Vital Signs Reviewed.  Constitutional: Patient is in no acute distress. Well-developed and well-nourished.  Head: Atraumatic. Normocephalic.  Eyes: PERRL. EOM intact. Conjunctivae are not pale. No scleral icterus.  ENT: Mucous membranes are moist. Oropharynx is clear and symmetric.    Neck: Supple. Full ROM. No lymphadenopathy.  Cardiovascular: Regular rate. Regular rhythm. No murmurs, rubs, or gallops. Distal pulses are 2+ and symmetric.  Pulmonary/Chest: No respiratory distress. Clear to auscultation bilaterally. No wheezing or rales.  Abdominal: Soft and non-distended.  There is no tenderness.  No rebound, guarding, or rigidity. Good bowel sounds.  Genitourinary: No CVA tenderness  Musculoskeletal: Moves all extremities. No obvious deformities. No edema. No calf tenderness.  Back: Lumbar paraspinal muscle tenderness. No midline bony tenderness, deformities, or step-offs of the T-spine or L-spine. Skin appears normal without abrasions or bruising. No erythema, induration, or fluctuance.   Skin: Warm and dry.  Neurological:  Alert, awake, and appropriate.  Normal speech. Negative straight leg raise bilaterally. No strength deficit; equal and 5/5 in bilateral upper and lower extremities. DTRs 2+ and equal. No acute focal neurological deficits are appreciated.  Psychiatric: Normal affect. Good eye contact. Appropriate in content.     ED Course   Procedures  ED Vital Signs:  Vitals:    11/22/24 0221 11/22/24 0320 11/22/24 0355   BP: (!) 146/84 (!) 134/92    Pulse: 65 75 76   Resp: 14 16 16   Temp: 98 °F (36.7 °C)  98 °F (36.7 °C)   TempSrc: Oral  Oral   SpO2: 100% 99% 99%   Weight: 79 kg (174 lb 3.2 oz)     Height: 5' 8" (1.727 m)   "       Abnormal Lab Results:  Labs Reviewed   PREGNANCY TEST, URINE RAPID       Result Value    Preg Test, Ur Negative      Narrative:     Specimen Source->Urine        All Lab Results:  Results for orders placed or performed during the hospital encounter of 11/22/24   Pregnancy, urine rapid (UPT)    Collection Time: 11/22/24  2:27 AM   Result Value Ref Range    Preg Test, Ur Negative          Imaging Results:  Imaging Results    None                 The Emergency Provider reviewed the vital signs and test results, which are outlined above.     ED Discussion     3:38 AM: Reassessed pt at this time. Discussed with pt all pertinent ED information and results. Discussed pt dx and plan of tx. Gave pt all f/u and return to the ED instructions. All questions and concerns were addressed at this time. Pt expresses understanding of information and instructions, and is comfortable with plan to discharge. Pt is stable for discharge.    I discussed with patient and/or family/caretaker that evaluation in the ED does not suggest any emergent or life threatening medical conditions requiring immediate intervention beyond what was provided in the ED, and I believe patient is safe for discharge.  Regardless, an unremarkable evaluation in the ED does not preclude the development or presence of a serious of life threatening condition. As such, patient was instructed to return immediately for any worsening or change in current symptoms.         Medical Decision Making  Amount and/or Complexity of Data Reviewed  Labs: ordered. Decision-making details documented in ED Course.    Risk  Prescription drug management.                ED Medication(s):  Medications - No data to display    Discharge Medication List as of 11/22/2024  3:39 AM        START taking these medications    Details   naproxen (NAPROSYN) 375 MG tablet Take 1 tablet (375 mg total) by mouth 2 (two) times daily with meals., Starting Fri 11/22/2024, Print      tiZANidine  (ZANAFLEX) 4 MG tablet Take 1 tablet (4 mg total) by mouth every 6 (six) hours as needed (pain)., Starting Fri 11/22/2024, Until Mon 12/2/2024 at 2359, Print              Follow-up Information       [facility] In 3 days.    Contact information:  [address] [facility] [address] [phone]               [facility] - Emergency Dept..    Specialty: Emergency Medicine  Why: As needed, If symptoms worsen  Contact information:  [address] [facility] [address] [phone]                               Scribe Attestation:   Scribe #1: I performed the above scribed service and the documentation accurately describes the services I performed. I attest to the accuracy of the note.     Attending:   Physician Attestation Statement for Scribe #1: I, Renan Mercado MD, personally performed the services described in this documentation, as scribed by Sarah Barba, in my presence, and it is both accurate and complete.           Clinical Impression       ICD-10-CM ICD-9-CM   1. Back strain, initial encounter  S39.012A 847.9       Disposition:   Disposition: Discharged  Condition: Stable         Renan Mercado MD  11/26/24 3698

## 2024-11-22 NOTE — Clinical Note
"Yocasta Kirkland" Stephon was seen and treated in our emergency department on 11/22/2024.  She may return to work on 11/25/2024.       If you have any questions or concerns, please don't hesitate to call.       RN    "

## 2024-11-26 ENCOUNTER — HOSPITAL ENCOUNTER (EMERGENCY)
Facility: HOSPITAL | Age: 34
Discharge: HOME | End: 2024-11-26
Attending: EMERGENCY MEDICINE
Payer: MEDICAID

## 2024-11-26 VITALS
WEIGHT: 169.19 LBS | HEART RATE: 84 BPM | DIASTOLIC BLOOD PRESSURE: 85 MMHG | RESPIRATION RATE: 16 BRPM | SYSTOLIC BLOOD PRESSURE: 136 MMHG | OXYGEN SATURATION: 100 % | TEMPERATURE: 99 F | BODY MASS INDEX: 25.73 KG/M2

## 2024-11-26 DIAGNOSIS — N76.4 ABSCESS OF LABIA: Primary | ICD-10-CM

## 2024-11-26 PROCEDURE — 99284 EMERGENCY DEPT VISIT MOD MDM: CPT | Mod: 25

## 2024-11-26 PROCEDURE — 56405 I&D VULVA/PERINEAL ABSCESS: CPT

## 2024-11-26 RX ORDER — SULFAMETHOXAZOLE AND TRIMETHOPRIM 800; 160 MG/1; MG/1
1 TABLET ORAL 2 TIMES DAILY
Qty: 20 TABLET | Refills: 0 | Status: SHIPPED | OUTPATIENT
Start: 2024-11-26 | End: 2024-12-04

## 2024-11-26 RX ORDER — HYDROCODONE BITARTRATE AND ACETAMINOPHEN 5; 325 MG/1; MG/1
1 TABLET ORAL EVERY 6 HOURS PRN
Qty: 12 TABLET | Refills: 0 | Status: SHIPPED | OUTPATIENT
Start: 2024-11-26

## 2024-11-26 NOTE — Clinical Note
"Yocasta Bassetty" Stephon was seen and treated in our emergency department on 11/26/2024.  She may return to work on 11/29/2024.       If you have any questions or concerns, please don't hesitate to call.      Agustin Fabian NP"

## 2024-11-27 NOTE — ED PROVIDER NOTES
Encounter Date: 11/26/2024       History     Chief Complaint   Patient presents with    Abscess     Pt c/o abscess to her genital area, present x 3 days.     34-year-old female presents to the emergency department chief complaint of labial abscess.  Reports that she has had these labial abscess in the past.  Reports some began about 3 days ago.  She reports pain and swelling to the site.  Denies any spontaneous drainage.  Denies any fever, chills, rectal pain, vaginal bleeding, vaginal discharge, vaginal lesions, trouble urinating, or any other complaints.        Review of patient's allergies indicates:  No Known Allergies  Past Medical History:   Diagnosis Date    HIV (human immunodeficiency virus infection)      No past surgical history on file.  No family history on file.  Social History     Tobacco Use    Smoking status: Never   Substance Use Topics    Alcohol use: No    Drug use: No     Review of Systems   Constitutional:  Negative for fever.   HENT:  Negative for sore throat.    Respiratory:  Negative for shortness of breath.    Cardiovascular:  Negative for chest pain.   Gastrointestinal:  Negative for abdominal pain and nausea.   Genitourinary:  Negative for dysuria.   Musculoskeletal:  Negative for back pain.   Skin:  Negative for rash.        Labial swelling    Neurological:  Negative for weakness.   Hematological:  Does not bruise/bleed easily.       Physical Exam     Initial Vitals [11/26/24 1842]   BP Pulse Resp Temp SpO2   136/85 84 16 98.5 °F (36.9 °C) 100 %      MAP       --         Physical Exam    Nursing note and vitals reviewed.  Constitutional: She appears well-developed and well-nourished.  Non-toxic appearance. She does not have a sickly appearance. She does not appear ill. No distress.   HENT:   Head: Normocephalic and atraumatic.   Right Ear: Hearing normal.   Left Ear: Hearing normal.   Nose: Nose normal. Mouth/Throat: Uvula is midline and oropharynx is clear and moist.   Eyes:  Conjunctivae, EOM and lids are normal. Pupils are equal, round, and reactive to light.   Neck: Trachea normal. Neck supple.   Normal range of motion.   Full passive range of motion without pain.     Cardiovascular:  Normal rate, regular rhythm, normal heart sounds, intact distal pulses and normal pulses.           Pulmonary/Chest: Effort normal and breath sounds normal.   Abdominal: Abdomen is soft. Bowel sounds are normal. There is no abdominal tenderness. There is no rebound and no guarding.   Genitourinary:    Rectum normal.      Pelvic exam was performed with patient supine.   There is tenderness on the left labia.    Genitourinary Comments: Female chaperone present for this examination         Musculoskeletal:         General: Normal range of motion.      Cervical back: Normal, full passive range of motion without pain, normal range of motion and neck supple.     Neurological: She is alert and oriented to person, place, and time. She has normal strength and normal reflexes. No cranial nerve deficit or sensory deficit. GCS eye subscore is 4. GCS verbal subscore is 5. GCS motor subscore is 6.   Skin: Skin is warm and dry.   Psychiatric: She has a normal mood and affect. Her behavior is normal. Thought content normal.         ED Course   I & D - Incision and Drainage    Date/Time: 11/26/2024 8:13 PM  Location procedure was performed: [facility] EMERGENCY DEPARTMENT    Performed by: Agustin Fabian NP  Authorized by: Reji Cabral DO  Assisting provider: Agustin Fabian NP  Pre-operative diagnosis: Left labial abscess  Post-operative diagnosis: Left labial abscess  Consent Done: Yes  Consent: Verbal consent obtained.  Consent given by: patient  Patient identity confirmed: verbally with patient  Type: abscess  Body area: anogenital  Location details: perineum  Anesthesia: local infiltration    Anesthesia:  Local Anesthetic: lidocaine 1% without epinephrine  Anesthetic total: 5 mL    Patient sedated:  no  Description of findings: Bloody drainage   Risk factor: underlying major vessel  Scalpel size: 11  Incision type: single straight  Incision depth: dermal  Complexity: simple  Drainage: bloody  Drainage amount: scant  Wound treatment: incision, wound left open, drainage and expression of material  Packing material: none  Technical procedures used: Incision and drainage  Significant surgical tasks conducted by the assistant(s): Incision and drainage  Complications: No  Estimated blood loss (mL): 3  Specimens: No  Implants: No  Patient tolerance: Patient tolerated the procedure well with no immediate complications  Comments: Area of localized induration and mild fluctuance noted to the left lower labia.  Drained with 11 blade, bloody drainage was noted.  Induration noted to the inner vaginal wall without fluctuance.  No indication for incision and drainage to the site of the time. Discussed findings with the patient.  She agreed with disposition and treatment plan.    Incision depth: dermal        Labs Reviewed - No data to display       Imaging Results    None          Medications - No data to display  Medical Decision Making  Small area of induration with mild fluctuance noted to the outer portion of the left labia.  Attempted to drain, small amount of bloody drainage noted without pus. Small localized area of induration noted to the inner vaginal wall which could be an early Bartholin abscess. No fluctuation noted to the inner vaginal wall, no tunneling to the rectum or in the vagina.  Discussed with the patient that there is no indication to attempt to drain area at this time. Will place patient on Bactrim antibiotic.  Discussed with the patient that this may get larger in turn into a Bartholin abscess.  If so she would need to come back to the emergency department for drainage.  She verbalized understanding.  Sterile dressing applied to the outer left labial wall where incision was made.  No packing was  introduced.  She is afebrile, nontoxic-appearing, no indication for systemic infection here in the emergency department tonight.  Patient was sent home with Norco pain medication.  Discussed with the patient at the drive or operate machinery while taking this medication.  She verbalized understanding.    Risk  Prescription drug management.                                      Clinical Impression:  Final diagnoses:  [N76.4] Abscess of labia (Primary)          ED Disposition Condition    Discharge Stable          ED Prescriptions       Medication Sig Dispense Start Date End Date Auth. Provider    HYDROcodone-acetaminophen (NORCO) 5-325 mg per tablet Take 1 tablet by mouth every 6 (six) hours as needed for Pain. 12 tablet 11/26/2024 -- Agustin Fabian NP    sulfamethoxazole-trimethoprim 800-160mg (BACTRIM DS) 800-160 mg Tab Take 1 tablet by mouth 2 (two) times daily. for 10 days 20 tablet 11/26/2024 12/6/2024 Agustin Fabian NP          Follow-up Information       Follow up With Specialties Details Why Contact Info    [facility] - Emergency Dept. Emergency Medicine Go to  As needed, If symptoms worsen [id number] [facility] [address] [phone]    Primary Care Provider  Schedule an appointment as soon as possible for a visit  As needed              Agustin Fabian NP  11/27/24 0027

## 2024-12-04 ENCOUNTER — HOSPITAL ENCOUNTER (EMERGENCY)
Facility: HOSPITAL | Age: 34
Discharge: HOME | End: 2024-12-04
Attending: EMERGENCY MEDICINE
Payer: MEDICAID

## 2024-12-04 VITALS
HEART RATE: 68 BPM | TEMPERATURE: 99 F | DIASTOLIC BLOOD PRESSURE: 78 MMHG | BODY MASS INDEX: 26.3 KG/M2 | HEIGHT: 68 IN | SYSTOLIC BLOOD PRESSURE: 116 MMHG | RESPIRATION RATE: 18 BRPM | WEIGHT: 173.5 LBS | OXYGEN SATURATION: 100 %

## 2024-12-04 DIAGNOSIS — N76.4 ABSCESS, VULVA: Primary | ICD-10-CM

## 2024-12-04 LAB — B-HCG UR QL: NEGATIVE

## 2024-12-04 PROCEDURE — 56405 I&D VULVA/PERINEAL ABSCESS: CPT

## 2024-12-04 PROCEDURE — 99284 EMERGENCY DEPT VISIT MOD MDM: CPT | Mod: 25

## 2024-12-04 PROCEDURE — 81025 URINE PREGNANCY TEST: CPT | Performed by: EMERGENCY MEDICINE

## 2024-12-04 PROCEDURE — 63600175 PHARM REV CODE 636 W HCPCS: Performed by: EMERGENCY MEDICINE

## 2024-12-04 RX ORDER — LIDOCAINE HYDROCHLORIDE 10 MG/ML
1 INJECTION, SOLUTION EPIDURAL; INFILTRATION; INTRACAUDAL; PERINEURAL
Status: COMPLETED | OUTPATIENT
Start: 2024-12-04 | End: 2024-12-04

## 2024-12-04 RX ORDER — NAPROXEN 375 MG/1
375 TABLET ORAL 2 TIMES DAILY WITH MEALS
Qty: 30 TABLET | Refills: 0 | Status: SHIPPED | OUTPATIENT
Start: 2024-12-04

## 2024-12-04 RX ORDER — SULFAMETHOXAZOLE AND TRIMETHOPRIM 800; 160 MG/1; MG/1
2 TABLET ORAL 2 TIMES DAILY
Qty: 28 TABLET | Refills: 0 | Status: SHIPPED | OUTPATIENT
Start: 2024-12-04 | End: 2024-12-11

## 2024-12-04 RX ADMIN — LIDOCAINE HYDROCHLORIDE 10 MG: 10 INJECTION, SOLUTION EPIDURAL; INFILTRATION; INTRACAUDAL at 06:12

## 2024-12-04 NOTE — ED PROVIDER NOTES
SCRIBE #1 NOTE: I, Chen Torres, am scribing for, and in the presence of, Anoop Bergman MD. I have scribed the entire note.       History     Chief Complaint   Patient presents with    Abscess     Pt complaining of boil/abscess around pelvic/vaginal region x3 days     Review of patient's allergies indicates:  No Known Allergies      History of Present Illness     HPI    12/4/2024, 6:31 AM  History obtained from the patient      History of Present Illness: Yocasta Szymanski is a 34 y.o. female patient with a PMHx of HIV who presents to the Emergency Department for evaluation of a left vulvar abscess which onset gradually 3 days ago. Pt noticed an abscess develop which causes pain. Symptoms are constant and moderate in severity. No mitigating or exacerbating factors reported. Associated sxs include vulvar redness and pelvic pain. Patient denies any cough, congestion, fever, shortness of breath, and all other sxs at this time. No prior tx reported. No further complaints or concerns at this time.       Arrival mode: Personal Transportation      PCP: Ela, Primary Doctor        Past Medical History:  Past Medical History:   Diagnosis Date    HIV (human immunodeficiency virus infection)        Past Surgical History:  History reviewed. No pertinent surgical history.      Family History:  No family history on file.    Social History:  Social History     Tobacco Use    Smoking status: Never    Smokeless tobacco: Not on file   Substance and Sexual Activity    Alcohol use: No    Drug use: No    Sexual activity: Not on file        Review of Systems     Review of Systems   Constitutional:  Negative for fever.   HENT:  Negative for congestion and sore throat.    Respiratory:  Negative for cough and shortness of breath.    Cardiovascular:  Negative for chest pain.   Gastrointestinal:  Negative for nausea.   Genitourinary:  Positive for genital sores (left vulva) and pelvic pain. Negative for dysuria.   Musculoskeletal:   Negative for back pain.   Skin:  Negative for rash.   Neurological:  Negative for weakness.   Hematological:  Does not bruise/bleed easily.   All other systems reviewed and are negative.       Physical Exam     Initial Vitals [12/04/24 0519]   BP Pulse Resp Temp SpO2   123/78 81 18 98.6 °F (37 °C) 100 %      MAP       --          Physical Exam  Nursing Notes and Vital Signs Reviewed.  Constitutional: Patient is in no acute distress. Well-developed and well-nourished.  Head: Atraumatic. Normocephalic.  Eyes: PERRL. EOM intact. Conjunctivae are not pale. No scleral icterus.  ENT: Mucous membranes are moist. Oropharynx is clear and symmetric.    Neck: Supple. Full ROM. No lymphadenopathy.  Cardiovascular: Regular rate. Regular rhythm. No murmurs, rubs, or gallops. Distal pulses are 2+ and symmetric.  Pulmonary/Chest: No respiratory distress. Clear to auscultation bilaterally. No wheezing or rales.  Abdominal: Soft and non-distended.  There is no tenderness.  No rebound, guarding, or rigidity.  Genitourinary: No CVA tenderness.  Pelvic exam: VULVA: Left vulvar abscess with erythema and fluctuance.  Musculoskeletal: Moves all extremities. No obvious deformities. No edema.  Skin: Warm and dry.  Neurological:  Alert, awake, and appropriate.  Normal speech.  No acute focal neurological deficits are appreciated.  Psychiatric: Normal affect. Good eye contact. Appropriate in content.     ED Course   I & D - Incision and Drainage    Date/Time: 12/4/2024 6:38 AM  Location procedure was performed: [facility] EMERGENCY DEPARTMENT    Performed by: Anoop Bergman MD  Authorized by: nAoop Bergman MD  Type: abscess (2 cm)  Body area: anogenital  Location details: vulva  Anesthesia: local infiltration    Anesthesia:  Local Anesthetic: lidocaine 1% without epinephrine    Patient sedated: no  Risk factor: underlying major vessel  Scalpel size: 11  Incision type: single straight  Incision depth: dermal  Complexity: simple  Drainage:  "purulent  Drainage amount: copious  Wound treatment: expression of material  Packing material: 1/2 in gauze  Complications: No  Patient tolerance: Patient tolerated the procedure well with no immediate complications    Incision depth: dermal        ED Vital Signs:  Vitals:    12/04/24 0519 12/04/24 0534 12/04/24 0600   BP: 123/78 123/78 116/78   Pulse: 81 73 68   Resp: 18     Temp: 98.6 °F (37 °C)     TempSrc: Oral     SpO2: 100% 100% 100%   Weight: 78.7 kg (173 lb 8 oz)     Height: 5' 8" (1.727 m)         Abnormal Lab Results:  Labs Reviewed   PREGNANCY TEST, URINE RAPID       Result Value    Preg Test, Ur Negative      Narrative:     Specimen Source->Urine        All Lab Results:  Results for orders placed or performed during the hospital encounter of 12/04/24   Rapid Pregnancy, Urine    Collection Time: 12/04/24  5:31 AM   Result Value Ref Range    Preg Test, Ur Negative          Imaging Results:  Imaging Results    None          No EKG ordered.           The Emergency Provider reviewed the vital signs and test results, which are outlined above.     ED Discussion       6:31 AM: Reassessed pt at this time. Discussed with pt all pertinent ED information and results. Discussed pt dx and plan of tx. Gave pt all f/u and return to the ED instructions. All questions and concerns were addressed at this time. Pt expresses understanding of information and instructions, and is comfortable with plan to discharge. Pt is stable for discharge.    I discussed with patient and/or family/caretaker that evaluation in the ED does not suggest any emergent or life threatening medical conditions requiring immediate intervention beyond what was provided in the ED, and I believe patient is safe for discharge.  Regardless, an unremarkable evaluation in the ED does not preclude the development or presence of a serious of life threatening condition. As such, patient was instructed to return immediately for any worsening or change in current " symptoms.         Medical Decision Making  DDx: abscess, cyst    Amount and/or Complexity of Data Reviewed  Labs: ordered. Decision-making details documented in ED Course.  Discussion of management or test interpretation with external provider(s): I&D performed - purulent drainage. Rx abx    Risk  Prescription drug management.                ED Medication(s):  Medications   LIDOcaine (PF) 10 mg/ml (1%) injection 10 mg (10 mg Other Given 12/4/24 0611)       Discharge Medication List as of 12/4/2024  6:30 AM           Follow-up Information       [facility] In 2 days.    Contact information:  [address] [facility] [address] [phone]                                 Scribe Attestation:   Scribe #1: I performed the above scribed service and the documentation accurately describes the services I performed. I attest to the accuracy of the note.     Attending:   Physician Attestation Statement for Scribe #1: I, Anoop Bergman MD, personally performed the services described in this documentation, as scribed by Chen Torres, in my presence, and it is both accurate and complete.           Clinical Impression       ICD-10-CM ICD-9-CM   1. Abscess, vulva  N76.4 616.4       Disposition:   Disposition: Discharged  Condition: Stable       Anoop Bergman MD  12/04/24 0816

## 2024-12-04 NOTE — ED NOTES
Pt is AAOx4 GCS vss. C/o abscess on left side labia of perineum region x 3 days. Rating 6 out 10 pain

## 2025-08-03 PROCEDURE — 99284 EMERGENCY DEPT VISIT MOD MDM: CPT

## 2025-08-03 RX ORDER — CLINDAMYCIN HYDROCHLORIDE 150 MG/1
300 CAPSULE ORAL
Status: COMPLETED | OUTPATIENT
Start: 2025-08-04 | End: 2025-08-04

## 2025-08-03 RX ORDER — HYDROCODONE BITARTRATE AND ACETAMINOPHEN 5; 325 MG/1; MG/1
1 TABLET ORAL
Refills: 0 | Status: COMPLETED | OUTPATIENT
Start: 2025-08-04 | End: 2025-08-04

## 2025-08-04 ENCOUNTER — HOSPITAL ENCOUNTER (EMERGENCY)
Facility: HOSPITAL | Age: 35
Discharge: HOME | End: 2025-08-04
Attending: EMERGENCY MEDICINE
Payer: MEDICAID

## 2025-08-04 VITALS
TEMPERATURE: 99 F | WEIGHT: 164.19 LBS | HEIGHT: 67 IN | OXYGEN SATURATION: 98 % | SYSTOLIC BLOOD PRESSURE: 156 MMHG | DIASTOLIC BLOOD PRESSURE: 92 MMHG | BODY MASS INDEX: 25.77 KG/M2 | RESPIRATION RATE: 18 BRPM | HEART RATE: 81 BPM

## 2025-08-04 DIAGNOSIS — K04.7 DENTAL INFECTION: Primary | ICD-10-CM

## 2025-08-04 PROCEDURE — 25000003 PHARM REV CODE 250

## 2025-08-04 RX ORDER — DICLOFENAC SODIUM 75 MG/1
75 TABLET, DELAYED RELEASE ORAL 2 TIMES DAILY
Qty: 10 TABLET | Refills: 0 | Status: SHIPPED | OUTPATIENT
Start: 2025-08-04 | End: 2025-08-09

## 2025-08-04 RX ORDER — CLINDAMYCIN HYDROCHLORIDE 150 MG/1
450 CAPSULE ORAL 3 TIMES DAILY
Qty: 63 CAPSULE | Refills: 0 | Status: SHIPPED | OUTPATIENT
Start: 2025-08-04 | End: 2025-08-11

## 2025-08-04 RX ORDER — BENZOCAINE/MENTHOL/ZINC CHLOR 20 %-0.26%
1 GEL (GRAM) MUCOUS MEMBRANE 3 TIMES DAILY
Qty: 7 G | Refills: 0 | Status: SHIPPED | OUTPATIENT
Start: 2025-08-04 | End: 2025-08-11

## 2025-08-04 RX ADMIN — HYDROCODONE BITARTRATE AND ACETAMINOPHEN 1 TABLET: 5; 325 TABLET ORAL at 12:08

## 2025-08-04 RX ADMIN — CLINDAMYCIN HYDROCHLORIDE 300 MG: 150 CAPSULE ORAL at 12:08

## 2025-08-04 NOTE — ED PROVIDER NOTES
"Encounter Date: 8/3/2025       History     Chief Complaint   Patient presents with    Abscess     Pt reports she has an abscess on her palate. Pain rating 10/10, since Friday      35-year-old female presents to the emergency department for a chief complaint of dental pain.  Reports the symptoms began about 3 days ago.  Symptoms have been continuous.  Symptoms have been worsening.  Symptoms are localized to the left upper frontal teeth.  Reports an associated "knot" that the patient believes as an abscess.  The pain is dull.  The pain does not radiate.  Denies any associated fever, chills, trouble breathing, trouble swallowing, trismus, muscle tone, facial swelling, facial erythema, chest pain, shortness for breath, or any other symptoms.  Has not been seen or evaluated for this complaint prior to his night in the emergency department.    The history is provided by the patient. No  was used.     Review of patient's allergies indicates:  No Known Allergies  Past Medical History:   Diagnosis Date    HIV (human immunodeficiency virus infection)      History reviewed. No pertinent surgical history.  No family history on file.  Social History[1]  Review of Systems   Constitutional:  Negative for fever.   HENT:  Positive for dental problem. Negative for sore throat.    Respiratory:  Negative for shortness of breath.    Cardiovascular:  Negative for chest pain.   Gastrointestinal:  Negative for abdominal pain and nausea.   Genitourinary:  Negative for dysuria.   Musculoskeletal:  Negative for back pain.   Skin:  Negative for rash.   Neurological:  Negative for weakness.   Hematological:  Does not bruise/bleed easily.   All other systems reviewed and are negative.      Physical Exam     Initial Vitals [08/03/25 2338]   BP Pulse Resp Temp SpO2   (!) 156/92 81 16 98.8 °F (37.1 °C) 98 %      MAP       --         Physical Exam    Nursing note and vitals reviewed.  Constitutional: She appears well-developed " and well-nourished.  Non-toxic appearance. She does not have a sickly appearance. She does not appear ill. No distress.   HENT:   Head: Normocephalic and atraumatic.   Right Ear: Hearing and tympanic membrane normal.   Left Ear: Hearing and tympanic membrane normal.   Nose: Nose normal. Mouth/Throat: Uvula is midline and oropharynx is clear and moist. No trismus in the jaw. Dental caries present. No uvula swelling.   Multiple avulsed teeth to the upper and lower jaw.  Area of focal point tenderness to the left 2nd incisor with moderate swelling of the gumline.  No fluctuance or abscess noted.  No drainage.  No associated facial cellulitis or facial swelling.  The patient is speaking in complete sentences, no stridor, no oral swelling, trismus, uvula midline without swelling, no submandibular, submental, sublingual swelling.   Eyes: Conjunctivae, EOM and lids are normal. Pupils are equal, round, and reactive to light.   Neck: Trachea normal. Neck supple.   Normal range of motion.   Full passive range of motion without pain.     Cardiovascular:  Normal rate, regular rhythm, normal heart sounds, intact distal pulses and normal pulses.           Pulmonary/Chest: Effort normal and breath sounds normal.   Abdominal: Abdomen is soft. Bowel sounds are normal. There is no abdominal tenderness. There is no rebound and no guarding.   Musculoskeletal:         General: Normal range of motion.      Cervical back: Normal, full passive range of motion without pain, normal range of motion and neck supple.     Neurological: She is alert and oriented to person, place, and time. She has normal strength and normal reflexes. No cranial nerve deficit or sensory deficit. GCS eye subscore is 4. GCS verbal subscore is 5. GCS motor subscore is 6.   Skin: Skin is warm and dry.   Psychiatric: She has a normal mood and affect. Her behavior is normal. Thought content normal.         ED Course   Procedures  Labs Reviewed - No data to display        Imaging Results    None          Medications   HYDROcodone-acetaminophen 5-325 mg per tablet 1 tablet (1 tablet Oral Given 8/4/25 0003)   clindamycin capsule 300 mg (300 mg Oral Given 8/4/25 0003)     Medical Decision Making  Left upper dental pain without facial swelling, erythema, secondary infection.  No drainable abscess or collection noted.  She is afebrile, nontoxic-appearing, no trismus, no submandibular, submental, sublingual swelling.  Do not suspect systemic infection, she is afebrile here in the emergency department with no secondary signs of infection.  The patient is stable for discharge.    Advised patient to make an appointment with dentist for examination and treatment of their dental pain, as well as routine cleaning and disease prevention.  For prevention, patient was encouraged to: perform oral hygiene daily; have regular professional cleaning; brush teeth at least twice a day; floss daily; avoid constant sipping of sugary drinks or frequent sucking on candy and mints; and use toothpaste containing fluoride. Encouraged patient to gargle with warm salt water several times a day, continue to floss after eating, and complete full course of antibiotics.  Patient given narcotic medication here in the emergency department tonight, she is here with her significant other, who is responsible .  Educated the patient on has a drive or operate machinery while taking narcotic medication.  She verbalized understanding.  [facility] information given to patient, she will follow up up with Dentistry.  She will return to the emergency department with any new or worsening symptoms.    Reassessed pt at this time. Discussed with pt all pertinent ED information and results. Discussed pt dx and plan of tx. Gave pt all f/u and return to the ED instructions. All questions and concerns were addressed at this time. Pt expresses understanding of information and instructions, and is comfortable with plan to  discharge. Pt is stable for discharge.      Risk  OTC drugs.  Prescription drug management.                                          Clinical Impression:  Final diagnoses:  [K04.7] Dental infection (Primary)          ED Disposition Condition    Discharge Stable          ED Prescriptions       Medication Sig Dispense Start Date End Date Auth. Provider    benzocaine-menthoL-zinc chlor (ORAJEL 3X TOOTHACHE-GUM) 20-0.26-0.15 % Gel 1 Dose by Mucous Membrane route 3 (three) times daily. for 7 days 7 g 8/4/2025 8/11/2025 Agustin Fabian NP    diclofenac (VOLTAREN) 75 MG EC tablet Take 1 tablet (75 mg total) by mouth 2 (two) times daily. for 5 days 10 tablet 8/4/2025 8/9/2025 Agustin Fabian NP    clindamycin (CLEOCIN) 150 MG capsule Take 3 capsules (450 mg total) by mouth 3 (three) times daily. for 7 days 63 capsule 8/4/2025 8/11/2025 Agustin Fabian NP          Follow-up Information       Follow up With Specialties Details Why Contact Info    [facility] - Emergency Dept. Emergency Medicine Go to  As needed, If symptoms worsen [address]  [facility] [address] [phone]    [facility]  Schedule an appointment as soon as possible for a visit                      [1]   Social History  Tobacco Use    Smoking status: Never   Substance Use Topics    Alcohol use: No    Drug use: No        Agustni Fabian NP  08/04/25 0337

## 2025-08-04 NOTE — Clinical Note
"Yocasta Kirkland" Stephon was seen and treated in our emergency department on 8/3/2025.  She may return to work on 08/06/2025.       If you have any questions or concerns, please don't hesitate to call.      Kimberly Byers RN RN    "